# Patient Record
Sex: FEMALE | Race: ASIAN | Employment: OTHER | URBAN - METROPOLITAN AREA
[De-identification: names, ages, dates, MRNs, and addresses within clinical notes are randomized per-mention and may not be internally consistent; named-entity substitution may affect disease eponyms.]

---

## 2020-09-21 ENCOUNTER — OFFICE VISIT (OUTPATIENT)
Dept: PRIMARY CARE CLINIC | Age: 70
End: 2020-09-21
Payer: MEDICARE

## 2020-09-21 VITALS
RESPIRATION RATE: 16 BRPM | BODY MASS INDEX: 26.22 KG/M2 | SYSTOLIC BLOOD PRESSURE: 126 MMHG | WEIGHT: 148 LBS | DIASTOLIC BLOOD PRESSURE: 80 MMHG | HEIGHT: 63 IN | OXYGEN SATURATION: 95 % | TEMPERATURE: 98.1 F | HEART RATE: 60 BPM

## 2020-09-21 VITALS
BODY MASS INDEX: 25.23 KG/M2 | WEIGHT: 142.4 LBS | DIASTOLIC BLOOD PRESSURE: 74 MMHG | TEMPERATURE: 96.8 F | HEIGHT: 63 IN | HEART RATE: 77 BPM | SYSTOLIC BLOOD PRESSURE: 126 MMHG | OXYGEN SATURATION: 97 %

## 2020-09-21 DIAGNOSIS — K21.9 GASTROESOPHAGEAL REFLUX DISEASE WITHOUT ESOPHAGITIS: Chronic | ICD-10-CM

## 2020-09-21 DIAGNOSIS — E11.65 TYPE 2 DIABETES MELLITUS WITH HYPERGLYCEMIA, WITHOUT LONG-TERM CURRENT USE OF INSULIN (HCC): Primary | Chronic | ICD-10-CM

## 2020-09-21 DIAGNOSIS — Z12.11 COLON CANCER SCREENING: ICD-10-CM

## 2020-09-21 DIAGNOSIS — E78.2 MIXED HYPERLIPIDEMIA: Chronic | ICD-10-CM

## 2020-09-21 DIAGNOSIS — J45.40 MODERATE PERSISTENT ASTHMA, UNCOMPLICATED: Chronic | ICD-10-CM

## 2020-09-21 DIAGNOSIS — I10 ESSENTIAL (PRIMARY) HYPERTENSION: Chronic | ICD-10-CM

## 2020-09-21 PROBLEM — E78.5 HYPERLIPIDEMIA: Status: ACTIVE | Noted: 2020-09-21

## 2020-09-21 PROCEDURE — 99214 OFFICE O/P EST MOD 30 MIN: CPT | Performed by: FAMILY MEDICINE

## 2020-09-21 RX ORDER — LINAGLIPTIN 5 MG/1
5 TABLET, FILM COATED ORAL DAILY
Qty: 90 TAB | Refills: 1 | Status: SHIPPED | OUTPATIENT
Start: 2020-09-21 | End: 2021-03-05 | Stop reason: SDUPTHER

## 2020-09-21 RX ORDER — ALBUTEROL SULFATE 90 UG/1
1 AEROSOL, METERED RESPIRATORY (INHALATION) 2 TIMES DAILY
COMMUNITY
Start: 2020-09-15 | End: 2020-09-21 | Stop reason: SDUPTHER

## 2020-09-21 RX ORDER — CALCIUM CITRATE/VITAMIN D3 200MG-6.25
TABLET ORAL
COMMUNITY
Start: 2020-08-18 | End: 2020-09-21 | Stop reason: SDUPTHER

## 2020-09-21 RX ORDER — GLYBURIDE 2.5 MG/1
1 TABLET ORAL 2 TIMES DAILY
COMMUNITY
Start: 2020-07-17 | End: 2020-09-21 | Stop reason: SDUPTHER

## 2020-09-21 RX ORDER — METFORMIN HYDROCHLORIDE 1000 MG/1
1000 TABLET ORAL 2 TIMES DAILY
Qty: 180 TAB | Refills: 1 | Status: SHIPPED | OUTPATIENT
Start: 2020-09-21 | End: 2021-03-05 | Stop reason: SDUPTHER

## 2020-09-21 RX ORDER — METFORMIN HYDROCHLORIDE 1000 MG/1
1 TABLET ORAL 2 TIMES DAILY
COMMUNITY
Start: 2020-07-17 | End: 2020-09-21 | Stop reason: SDUPTHER

## 2020-09-21 RX ORDER — ALBUTEROL SULFATE 90 UG/1
1 AEROSOL, METERED RESPIRATORY (INHALATION) 2 TIMES DAILY
Qty: 1 INHALER | Refills: 2 | Status: SHIPPED | OUTPATIENT
Start: 2020-09-21 | End: 2021-03-05 | Stop reason: SDUPTHER

## 2020-09-21 RX ORDER — CALCIUM CITRATE/VITAMIN D3 200MG-6.25
TABLET ORAL
Qty: 200 STRIP | Refills: 3 | Status: SHIPPED | OUTPATIENT
Start: 2020-09-21 | End: 2021-03-05 | Stop reason: SDUPTHER

## 2020-09-21 RX ORDER — FLUTICASONE PROPIONATE 110 UG/1
1 AEROSOL, METERED RESPIRATORY (INHALATION) 2 TIMES DAILY
Qty: 3 INHALER | Refills: 1 | Status: SHIPPED | OUTPATIENT
Start: 2020-09-21 | End: 2021-03-05 | Stop reason: SDUPTHER

## 2020-09-21 RX ORDER — LISINOPRIL 10 MG/1
10 TABLET ORAL DAILY
Qty: 90 TAB | Refills: 1 | Status: SHIPPED | OUTPATIENT
Start: 2020-09-21 | End: 2021-03-05 | Stop reason: SDUPTHER

## 2020-09-21 RX ORDER — GLYBURIDE 2.5 MG/1
2.5 TABLET ORAL 2 TIMES DAILY
Qty: 180 TAB | Refills: 1 | Status: SHIPPED | OUTPATIENT
Start: 2020-09-21 | End: 2021-03-05 | Stop reason: SDUPTHER

## 2020-09-21 RX ORDER — METOPROLOL TARTRATE 25 MG/1
25 TABLET, FILM COATED ORAL 2 TIMES DAILY
Qty: 180 TAB | Refills: 1 | Status: SHIPPED | OUTPATIENT
Start: 2020-09-21 | End: 2021-03-05 | Stop reason: SDUPTHER

## 2020-09-21 RX ORDER — ATORVASTATIN CALCIUM 10 MG/1
1 TABLET, FILM COATED ORAL DAILY
COMMUNITY
Start: 2020-07-17 | End: 2020-09-21 | Stop reason: SDUPTHER

## 2020-09-21 RX ORDER — LANSOPRAZOLE 30 MG/1
1 CAPSULE, DELAYED RELEASE ORAL DAILY
COMMUNITY
Start: 2020-08-19 | End: 2020-09-21 | Stop reason: SDUPTHER

## 2020-09-21 RX ORDER — ATORVASTATIN CALCIUM 10 MG/1
10 TABLET, FILM COATED ORAL DAILY
Qty: 90 TAB | Refills: 1 | Status: SHIPPED | OUTPATIENT
Start: 2020-09-21 | End: 2021-03-05 | Stop reason: SDUPTHER

## 2020-09-21 RX ORDER — FLUTICASONE PROPIONATE 110 UG/1
1 AEROSOL, METERED RESPIRATORY (INHALATION) 2 TIMES DAILY
COMMUNITY
End: 2020-09-21 | Stop reason: SDUPTHER

## 2020-09-21 RX ORDER — METOPROLOL TARTRATE 25 MG/1
1 TABLET, FILM COATED ORAL 2 TIMES DAILY
COMMUNITY
Start: 2020-08-17 | End: 2020-09-21 | Stop reason: SDUPTHER

## 2020-09-21 RX ORDER — LINAGLIPTIN 5 MG/1
1 TABLET, FILM COATED ORAL DAILY
COMMUNITY
Start: 2020-09-15 | End: 2020-09-21 | Stop reason: SDUPTHER

## 2020-09-21 RX ORDER — LANSOPRAZOLE 30 MG/1
30 CAPSULE, DELAYED RELEASE ORAL DAILY
Qty: 90 CAP | Refills: 1 | Status: SHIPPED | OUTPATIENT
Start: 2020-09-21 | End: 2021-03-05 | Stop reason: SDUPTHER

## 2020-09-21 NOTE — PROGRESS NOTES
HISTORY OF PRESENT ILLNESS  Here with family member as     Diabetes:  DM follow up appt  -200. No diary seen as usual.   Last  Eye exam:2018   PT is taking metformin 1000 BID, glyburide 2.5 BID, tradjenta 5 mg. There is no medication concerns. Denies any neurological symptoms , and cuts heal well. Hypertension:  PT taking lisnorpil 10, lopressor 25. No concern about BP meds, not checking BP>       Hyperlipidemia:     on lipitor 10 mg. No concerns about meds, no myalgias. GERD - doing well on pantoprazole. Never had EGD. colonoscopy now due. Asthma - doing well. Not using MDI  Hardly. No cough. No eunice concerns since last Aileenis Roel is a 72 y.o. female. Past Medical History:   Diagnosis Date    Asthma     Diabetes ([facility] [address]     Heartburn     Hypercholesterolemia     Hyperlipidemia 9/21/2020    Hypertension      Social History     Tobacco Use    Smoking status: Never Smoker    Smokeless tobacco: Never Used   Substance Use Topics    Alcohol use: Not on file    Drug use: Not on file       Family History   Problem Relation Age of Onset    Diabetes Brother        Review of Systems   Constitutional: Negative. Negative for malaise/fatigue and weight loss. HENT: Negative for congestion. Eyes: Negative for pain. Respiratory: Negative for cough and shortness of breath. Cardiovascular: Negative for chest pain, palpitations and leg swelling. Gastrointestinal: Negative for abdominal pain, diarrhea and heartburn. Genitourinary: Negative for dysuria. Musculoskeletal: Negative for myalgias. Neurological: Negative for dizziness, tingling, tremors, weakness and headaches. Psychiatric/Behavioral: Negative for depression. The patient does not have insomnia. Physical Exam  Nursing note reviewed. Exam conducted with a chaperone present. Constitutional:       General: She is not in acute distress. Appearance: Normal appearance.  She is obese. She is not ill-appearing. HENT:      Head: Normocephalic and atraumatic. Eyes:      General: No scleral icterus. Right eye: No discharge. Left eye: No discharge. Extraocular Movements: Extraocular movements intact. Conjunctiva/sclera: Conjunctivae normal.   Cardiovascular:      Rate and Rhythm: Normal rate and regular rhythm. Pulses: Normal pulses. Heart sounds: Normal heart sounds. Pulmonary:      Effort: Pulmonary effort is normal. No respiratory distress. Breath sounds: Normal breath sounds. No wheezing or rales. Abdominal:      General: Abdomen is flat. There is no distension. Palpations: There is no mass. Tenderness: There is no abdominal tenderness. There is no right CVA tenderness or left CVA tenderness. Musculoskeletal:      Right lower leg: No edema. Left lower leg: No edema. Lymphadenopathy:      Cervical: No cervical adenopathy. Skin:     General: Skin is warm. Neurological:      General: No focal deficit present. Mental Status: She is alert and oriented to person, place, and time. Cranial Nerves: No cranial nerve deficit. Psychiatric:         Mood and Affect: Mood normal.         Behavior: Behavior normal.           ASSESSMENT and PLAN  Diagnoses and all orders for this visit:    1. Type 2 diabetes mellitus with hyperglycemia, without long-term current use of insulin ([facility])  Comments:  ? control. maxed out on tradjenta, metformin and glyburide ( she perfers over glipizide). Orders:  -     glyBURIDE (DIABETA) 2.5 mg tablet; Take 1 Tab by mouth two (2) times a day. -     metFORMIN (GLUCOPHAGE) 1,000 mg tablet; Take 1 Tab by mouth two (2) times a day. -     Tradjenta 5 mg tablet; Take 1 Tab by mouth daily.  -     True Metrix Glucose Test Strip strip; USE 1 STRIP TO CHECK GLUCOSE TWICE DAILY  -     METABOLIC PANEL, COMPREHENSIVE  -     LIPID PANEL  -     HEMOGLOBIN A1C WITH EAG    2.  Essential (primary) hypertension  Comments:  at goal  Orders:  -     metoprolol tartrate (LOPRESSOR) 25 mg tablet; Take 1 Tab by mouth two (2) times a day. -     METABOLIC PANEL, COMPREHENSIVE  -     LIPID PANEL  -     CBC WITH AUTOMATED DIFF    3. Gastroesophageal reflux disease without esophagitis  Comments:  controlled but may need egd bc  cant come off meds. Orders:  -     lansoprazole (PREVACID) 30 mg capsule; Take 1 Cap by mouth daily.  -     CBC WITH AUTOMATED DIFF    4. Mixed hyperlipidemia  -     atorvastatin (LIPITOR) 10 mg tablet; Take 1 Tab by mouth daily.  -     METABOLIC PANEL, COMPREHENSIVE  -     LIPID PANEL    5. Moderate persistent asthma, uncomplicated  Comments:  stable  Orders:  -     Ventolin HFA 90 mcg/actuation inhaler; Take 1 Puff by inhalation two (2) times a day. -     fluticasone propionate (FLOVENT HFA) 110 mcg/actuation inhaler; Take 1 Puff by inhalation two (2) times a day. 6. Colon cancer screening  Comments:  time to screen ,  see dr Darling Malagon. I discuss this and   dexa and mmaogram.  she is not ready yet. .. bc of covid. Orders:  -     CBC WITH AUTOMATED DIFF    Other orders  -     lisinopriL (PRINIVIL, ZESTRIL) 10 mg tablet; Take 1 Tab by mouth daily.  Indications: high blood pressure         Orders Placed This Encounter    METABOLIC PANEL, COMPREHENSIVE    LIPID PANEL    CBC WITH AUTOMATED DIFF    HEMOGLOBIN A1C WITH EAG    DISCONTD: Ventolin HFA 90 mcg/actuation inhaler    DISCONTD: True Metrix Glucose Test Strip strip    DISCONTD: glyBURIDE (DIABETA) 2.5 mg tablet    DISCONTD: lansoprazole (PREVACID) 30 mg capsule    DISCONTD: atorvastatin (LIPITOR) 10 mg tablet    DISCONTD: Tradjenta 5 mg tablet    DISCONTD: metFORMIN (GLUCOPHAGE) 1,000 mg tablet    DISCONTD: metoprolol tartrate (LOPRESSOR) 25 mg tablet    atorvastatin (LIPITOR) 10 mg tablet    glyBURIDE (DIABETA) 2.5 mg tablet    lansoprazole (PREVACID) 30 mg capsule    metFORMIN (GLUCOPHAGE) 1,000 mg tablet    metoprolol tartrate (LOPRESSOR) 25 mg tablet    Tradjenta 5 mg tablet    True Metrix Glucose Test Strip strip    DISCONTD: fluticasone propionate (FLOVENT HFA) 110 mcg/actuation inhaler    Ventolin HFA 90 mcg/actuation inhaler    fluticasone propionate (FLOVENT HFA) 110 mcg/actuation inhaler    lisinopriL (PRINIVIL, ZESTRIL) 10 mg tablet     Follow-up and Dispositions    · Return in about 6 months (around 3/21/2021) for Chronic office visit.

## 2020-09-24 RX ORDER — CETIRIZINE HYDROCHLORIDE 10 MG/1
TABLET, FILM COATED ORAL
Qty: 90 TAB | Refills: 99 | Status: SHIPPED | OUTPATIENT
Start: 2020-09-24 | End: 2021-03-05 | Stop reason: SDUPTHER

## 2020-09-29 LAB
ALBUMIN SERPL-MCNC: 4.5 G/DL (ref 3.8–4.8)
ALBUMIN/GLOB SERPL: 1.8 {RATIO} (ref 1.2–2.2)
ALP SERPL-CCNC: 69 IU/L (ref 39–117)
ALT SERPL-CCNC: 18 IU/L (ref 0–32)
AST SERPL-CCNC: 16 IU/L (ref 0–40)
BASOPHILS # BLD AUTO: 0.1 X10E3/UL (ref 0–0.2)
BASOPHILS NFR BLD AUTO: 1 %
BILIRUB SERPL-MCNC: 0.4 MG/DL (ref 0–1.2)
BUN SERPL-MCNC: 29 MG/DL (ref 8–27)
BUN/CREAT SERPL: 29 (ref 12–28)
CALCIUM SERPL-MCNC: 10 MG/DL (ref 8.7–10.3)
CHLORIDE SERPL-SCNC: 99 MMOL/L (ref 96–106)
CHOLEST SERPL-MCNC: 173 MG/DL (ref 100–199)
CO2 SERPL-SCNC: 23 MMOL/L (ref 20–29)
CREAT SERPL-MCNC: 1 MG/DL (ref 0.57–1)
EOSINOPHIL # BLD AUTO: 0.4 X10E3/UL (ref 0–0.4)
EOSINOPHIL NFR BLD AUTO: 5 %
ERYTHROCYTE [DISTWIDTH] IN BLOOD BY AUTOMATED COUNT: 12.6 % (ref 11.7–15.4)
EST. AVERAGE GLUCOSE BLD GHB EST-MCNC: 223 MG/DL
GLOBULIN SER CALC-MCNC: 2.5 G/DL (ref 1.5–4.5)
GLUCOSE SERPL-MCNC: 222 MG/DL (ref 65–99)
HBA1C MFR BLD: 9.4 % (ref 4.8–5.6)
HCT VFR BLD AUTO: 38.6 % (ref 34–46.6)
HDLC SERPL-MCNC: 49 MG/DL
HGB BLD-MCNC: 12.8 G/DL (ref 11.1–15.9)
IMM GRANULOCYTES # BLD AUTO: 0 X10E3/UL (ref 0–0.1)
IMM GRANULOCYTES NFR BLD AUTO: 0 %
LDLC SERPL CALC-MCNC: 81 MG/DL (ref 0–99)
LYMPHOCYTES # BLD AUTO: 2.5 X10E3/UL (ref 0.7–3.1)
LYMPHOCYTES NFR BLD AUTO: 35 %
MCH RBC QN AUTO: 30 PG (ref 26.6–33)
MCHC RBC AUTO-ENTMCNC: 33.2 G/DL (ref 31.5–35.7)
MCV RBC AUTO: 90 FL (ref 79–97)
MONOCYTES # BLD AUTO: 0.6 X10E3/UL (ref 0.1–0.9)
MONOCYTES NFR BLD AUTO: 8 %
NEUTROPHILS # BLD AUTO: 3.6 X10E3/UL (ref 1.4–7)
NEUTROPHILS NFR BLD AUTO: 51 %
PLATELET # BLD AUTO: 290 X10E3/UL (ref 150–450)
POTASSIUM SERPL-SCNC: 5 MMOL/L (ref 3.5–5.2)
PROT SERPL-MCNC: 7 G/DL (ref 6–8.5)
RBC # BLD AUTO: 4.27 X10E6/UL (ref 3.77–5.28)
SODIUM SERPL-SCNC: 137 MMOL/L (ref 134–144)
TRIGL SERPL-MCNC: 265 MG/DL (ref 0–149)
VLDLC SERPL CALC-MCNC: 43 MG/DL (ref 5–40)
WBC # BLD AUTO: 7.1 X10E3/UL (ref 3.4–10.8)

## 2021-03-05 ENCOUNTER — OFFICE VISIT (OUTPATIENT)
Dept: PRIMARY CARE CLINIC | Age: 71
End: 2021-03-05
Payer: MEDICARE

## 2021-03-05 VITALS
SYSTOLIC BLOOD PRESSURE: 172 MMHG | DIASTOLIC BLOOD PRESSURE: 95 MMHG | TEMPERATURE: 97.4 F | BODY MASS INDEX: 24.98 KG/M2 | WEIGHT: 141 LBS | HEART RATE: 78 BPM | HEIGHT: 63 IN

## 2021-03-05 DIAGNOSIS — I10 ESSENTIAL (PRIMARY) HYPERTENSION: Chronic | ICD-10-CM

## 2021-03-05 DIAGNOSIS — I10 ESSENTIAL (PRIMARY) HYPERTENSION: ICD-10-CM

## 2021-03-05 DIAGNOSIS — Z00.00 MEDICARE ANNUAL WELLNESS VISIT, SUBSEQUENT: ICD-10-CM

## 2021-03-05 DIAGNOSIS — E78.2 MIXED HYPERLIPIDEMIA: ICD-10-CM

## 2021-03-05 DIAGNOSIS — J45.40 MODERATE PERSISTENT ASTHMA, UNCOMPLICATED: Chronic | ICD-10-CM

## 2021-03-05 DIAGNOSIS — K21.9 GASTROESOPHAGEAL REFLUX DISEASE WITHOUT ESOPHAGITIS: Chronic | ICD-10-CM

## 2021-03-05 DIAGNOSIS — E11.65 TYPE 2 DIABETES MELLITUS WITH HYPERGLYCEMIA, WITHOUT LONG-TERM CURRENT USE OF INSULIN (HCC): Primary | ICD-10-CM

## 2021-03-05 PROCEDURE — G0439 PPPS, SUBSEQ VISIT: HCPCS | Performed by: FAMILY MEDICINE

## 2021-03-05 PROCEDURE — 99214 OFFICE O/P EST MOD 30 MIN: CPT | Performed by: FAMILY MEDICINE

## 2021-03-05 RX ORDER — METOPROLOL TARTRATE 25 MG/1
25 TABLET, FILM COATED ORAL 2 TIMES DAILY
Qty: 180 TAB | Refills: 1 | Status: SHIPPED | OUTPATIENT
Start: 2021-03-05 | End: 2022-04-19 | Stop reason: SDUPTHER

## 2021-03-05 RX ORDER — ATORVASTATIN CALCIUM 10 MG/1
10 TABLET, FILM COATED ORAL DAILY
Qty: 90 TAB | Refills: 1 | Status: SHIPPED | OUTPATIENT
Start: 2021-03-05 | End: 2021-09-14 | Stop reason: SDUPTHER

## 2021-03-05 RX ORDER — FLUTICASONE PROPIONATE 110 UG/1
1 AEROSOL, METERED RESPIRATORY (INHALATION) 2 TIMES DAILY
Qty: 3 INHALER | Refills: 1 | Status: SHIPPED | OUTPATIENT
Start: 2021-03-05 | End: 2021-08-05 | Stop reason: SDUPTHER

## 2021-03-05 RX ORDER — CETIRIZINE HCL 10 MG
10 TABLET ORAL DAILY
Qty: 90 TAB | Refills: 1 | Status: SHIPPED | OUTPATIENT
Start: 2021-03-05 | End: 2021-08-05 | Stop reason: ALTCHOICE

## 2021-03-05 RX ORDER — CALCIUM CITRATE/VITAMIN D3 200MG-6.25
TABLET ORAL
Qty: 200 STRIP | Refills: 3 | Status: SHIPPED | OUTPATIENT
Start: 2021-03-05 | End: 2021-08-05 | Stop reason: SDUPTHER

## 2021-03-05 RX ORDER — LINAGLIPTIN 5 MG/1
5 TABLET, FILM COATED ORAL DAILY
Qty: 90 TAB | Refills: 1 | Status: SHIPPED | OUTPATIENT
Start: 2021-03-05 | End: 2021-12-07

## 2021-03-05 RX ORDER — METFORMIN HYDROCHLORIDE 1000 MG/1
1000 TABLET ORAL 2 TIMES DAILY
Qty: 180 TAB | Refills: 1 | Status: SHIPPED | OUTPATIENT
Start: 2021-03-05 | End: 2021-03-14

## 2021-03-05 RX ORDER — GLYBURIDE 2.5 MG/1
2.5 TABLET ORAL 2 TIMES DAILY
Qty: 180 TAB | Refills: 1 | Status: SHIPPED | OUTPATIENT
Start: 2021-03-05 | End: 2021-03-14

## 2021-03-05 RX ORDER — ALBUTEROL SULFATE 90 UG/1
1 AEROSOL, METERED RESPIRATORY (INHALATION) 2 TIMES DAILY
Qty: 1 INHALER | Refills: 2 | Status: SHIPPED | OUTPATIENT
Start: 2021-03-05 | End: 2022-07-21 | Stop reason: SDUPTHER

## 2021-03-05 RX ORDER — LISINOPRIL 10 MG/1
10 TABLET ORAL DAILY
Qty: 90 TAB | Refills: 1 | Status: SHIPPED | OUTPATIENT
Start: 2021-03-05 | End: 2021-08-05

## 2021-03-05 RX ORDER — LANSOPRAZOLE 30 MG/1
30 CAPSULE, DELAYED RELEASE ORAL DAILY
Qty: 90 CAP | Refills: 1 | Status: SHIPPED | OUTPATIENT
Start: 2021-03-05 | End: 2022-01-03

## 2021-03-05 NOTE — ACP (ADVANCE CARE PLANNING)
Discussed with patient. Will provide information regarding ACP but she does know she wants to be full code and have her daughter as decision maker if she is unable to make decisions for herself.

## 2021-03-05 NOTE — PROGRESS NOTES
Arsen  [address] [place] [address], [address] [place] [address]  [phone]               Date of visit: 3/5/2021       This is an Subsequent Medicare Annual Wellness Visit (AWV), (Performed more than 12 months after effective date of Medicare Part B enrollment and 12 months after last preventive visit)    I have reviewed the patient's medical history in detail and updated the computerized patient record. Mikayla Bernal is a 72 y.o. female   History obtained from: the patient and daughter. Concerns today   (Patient understands that medical problems addressed today may incur additional cost as this is a preventive visit)    Mikayla Bernal is a 72 y.o. female who presents for medication refill. She is also due for her medicare wellness. She agrees to do both visits understanding there may be a cost for additional services. Patient is compliant on medications without an adverse effects. HTN: at goal  HLD: Lipid panel due  T2DM:  Uncontrolled. Last A1c was 9.4% in September. Checks glucose sporadically, not daily. Does not have a log. Fasting was 180 this morning. Diabetes Checklist  ACE inhibitor: lisinopril 10mg  Last Microalbumin: due  Pneumonia vaccine 19-64?: not sure, will check with [facility]  Last seen by opthalmology: about 2 years ago    History     Patient Active Problem List   Diagnosis Code    Heartburn R12    Asthma J45.909    Diabetes ([facility] [address] E11.9    Essential (primary) hypertension I10    Hyperlipidemia E78.5    Moderate persistent asthma, uncomplicated N66.32    Gastroesophageal reflux disease without esophagitis K21.9     Past Medical History:   Diagnosis Date    Asthma     Diabetes ([facility] [address]     Heartburn     Hypercholesterolemia     Hyperlipidemia 9/21/2020    Hypertension       History reviewed. No pertinent surgical history.   Allergies   Allergen Reactions    Iodine Rash     Current Outpatient Medications   Medication Sig Dispense Refill    cetirizine (Allergy Relief, cetirizine,) 10 mg tablet Take 1 Tab by mouth daily. 90 Tab 1    atorvastatin (LIPITOR) 10 mg tablet Take 1 Tab by mouth daily. 90 Tab 1    glyBURIDE (DIABETA) 2.5 mg tablet Take 1 Tab by mouth two (2) times a day. 180 Tab 1    lansoprazole (PREVACID) 30 mg capsule Take 1 Cap by mouth daily. 90 Cap 1    metFORMIN (GLUCOPHAGE) 1,000 mg tablet Take 1 Tab by mouth two (2) times a day. 180 Tab 1    metoprolol tartrate (LOPRESSOR) 25 mg tablet Take 1 Tab by mouth two (2) times a day. 180 Tab 1    Tradjenta 5 mg tablet Take 1 Tab by mouth daily. 90 Tab 1    True Metrix Glucose Test Strip strip USE 1 STRIP TO CHECK GLUCOSE TWICE DAILY 200 Strip 3    Ventolin HFA 90 mcg/actuation inhaler Take 1 Puff by inhalation two (2) times a day. 1 Inhaler 2    fluticasone propionate (FLOVENT HFA) 110 mcg/actuation inhaler Take 1 Puff by inhalation two (2) times a day. 3 Inhaler 1    lisinopriL (PRINIVIL, ZESTRIL) 10 mg tablet Take 1 Tab by mouth daily. Indications: high blood pressure 90 Tab 1     Family History   Problem Relation Age of Onset    Diabetes Brother      Social History     Tobacco Use    Smoking status: Never Smoker    Smokeless tobacco: Never Used   Substance Use Topics    Alcohol use: Not on file       Specialists/Care Team   Ruthann Akash has established care with the following healthcare providers:  Patient Care Team:  Grace Munoz MD as PCP - General (Family Medicine)  Grace Munoz MD as PCP - [facility]    Health Risk Assessment     Demographics   female  72 y.o.     General Health Questions   -During the past 4 weeks:   -how would you rate your health in general? Good   -how often have you been bothered by feeling dizzy when standing up? never   -how much have you been bothered by bodily pain? not   -Have you noticed any hearing difficulties? no   -has your physical and emotional health limited your social activities with family or friends? no    Emotional Health Questions   -Do you have a history of depression, anxiety, or emotional problems? no  -Over the past 2 weeks, have you felt down, depressed or hopeless? no  -Over the past 2 weeks, have you felt little interest or pleasure in doing things? no    Health Habits   Please describe your diet habits: \"eats well\"  Do you get 5 servings of fruits or vegetables daily? Not sure. Eats korean diet, a lot of rice. Do you exercise regularly? yes    Activities of Daily Living and Functional Status   -Do you need help with eating, walking, dressing, bathing, toileting, the phone, transportation, shopping, preparing meals, housework, laundry, medications or managing money? no  -In the past four weeks, was someone available to help you if you needed and wanted help with anything? yes  -Are you confident are you that you can control and manage most of your health problems? yes  -Have you been given information to help you keep track of your medications? yes  -How often do you have trouble taking your medications as prescribed? never    Fall Risk and Home Safety   Have you fallen 2 or more times in the past year? no  Does your home have rugs in the hallway, lack grab bars in the bathroom, lack handrails on the stairs or have poor lighting? no  Do you have smoke detectors and check them regularly? yes  Do you have difficulties driving a car? no  Do you always fasten your seat belt when you are in a car? yes    Review of Systems (if indicated for problems addressed today)     Review of Systems   Constitutional: Negative for chills, fever and weight loss. HENT: Negative for congestion and sore throat. Eyes: Negative for blurred vision, double vision and redness. Respiratory: Negative for cough and shortness of breath. Cardiovascular: Negative for chest pain and palpitations. Gastrointestinal: Negative for abdominal pain, blood in stool and vomiting. Genitourinary: Negative for dysuria and hematuria.    Musculoskeletal: Negative for falls and myalgias. Skin: Negative for rash. Neurological: Negative for dizziness, focal weakness and headaches. Endo/Heme/Allergies: Negative for polydipsia. Does not bruise/bleed easily. Psychiatric/Behavioral: Negative for substance abuse and suicidal ideas. Reviewed PmHx, FmHx, SocHx as well as meds and allergies, updated and dated in the chart. Physical Examination     Vitals:    03/05/21 0845   BP: (!) 172/95   Pulse: 78   Temp: 97.4 °F (36.3 °C)   TempSrc: Temporal   Weight: 141 lb (64 kg)   Height: 5' 3\" (1.6 m)     Visit Vitals  BP (!) 172/95 (BP 1 Location: Left upper arm, BP Patient Position: Sitting, BP Cuff Size: Large adult)   Pulse 78   Temp 97.4 °F (36.3 °C) (Temporal)   Ht 5' 3\" (1.6 m)   Wt 141 lb (64 kg)   BMI 24.98 kg/m²     Physical Exam  Vitals signs reviewed. Constitutional:       General: She is not in acute distress. Appearance: Normal appearance. HENT:      Head: Normocephalic and atraumatic. Right Ear: External ear normal.      Left Ear: External ear normal.      Nose: Nose normal. No rhinorrhea. Mouth/Throat:      Mouth: Mucous membranes are moist.      Pharynx: Oropharynx is clear. Eyes:      Extraocular Movements: Extraocular movements intact. Conjunctiva/sclera: Conjunctivae normal.      Pupils: Pupils are equal, round, and reactive to light. Neck:      Musculoskeletal: Normal range of motion and neck supple. Cardiovascular:      Rate and Rhythm: Normal rate and regular rhythm. Pulses: Normal pulses. Pulmonary:      Effort: Pulmonary effort is normal. No respiratory distress. Breath sounds: Normal breath sounds. Abdominal:      General: Bowel sounds are normal.      Palpations: Abdomen is soft. Musculoskeletal: Normal range of motion. Right lower leg: No edema. Left lower leg: No edema. Skin:     General: Skin is warm. Findings: No rash. Neurological:      General: No focal deficit present.       Mental Status: She is alert and oriented to person, place, and time. Mental status is at baseline. Psychiatric:         Mood and Affect: Mood normal.         Behavior: Behavior normal.     Diabetic foot exam, including monofilament, sensation and pulses are normal. No fungal toenail infection, wounds, corns or calluses. Left:                                 Filament test (Normal) sensation with micro filament              Pulse DP: 2+                Deformities: No Callus  Right:                               Filament test (Normal) sensation with micro filament              Pulse DP: 2+                Deformities: No Callus    Was the patient's timed Up & Go test unsteady or longer than 30 seconds? no    Evaluation of Cognitive Function   Mood/affect:  happy  Orientation: Person, Place, Time and Situation  Appearance: age appropriate  Family member/caregiver input: daughter present and says mom is pretty on top of her health, no concerns from her point of view.     Advice/Referrals/Counseling (as indicated)   Education and counseling provided for any problems identified above: see above    Preventive Services     Health Maintenance   Topic Date Due    Hepatitis C Screening  Never done    MICROALBUMIN Q1  Never done    Eye Exam Retinal or Dilated  Never done    DTaP/Tdap/Td series (1 - Tdap) Never done    Colorectal Cancer Screening Combo  Never done    Breast Cancer Screen Mammogram  Never done    GLAUCOMA SCREENING Q2Y  Never done    Bone Densitometry (Dexa) Screening  Never done    Pneumococcal 65+ years (1 of 1 - PPSV23) Never done    Shingrix Vaccine Age 50> (2 of 2) 01/02/2020    A1C test (Diabetic or Prediabetic)  12/28/2020    COVID-19 Vaccine (2 of 2 - Moderna series) 03/11/2021    Lipid Screen  09/28/2021    Foot Exam Q1  03/05/2022    Medicare Yearly Exam  03/06/2022    Flu Vaccine  Completed      (Preventive care checklist to be included in patient instructions)  Discussed today Done Previously Not Needed    x   Pneumococcal vaccines    x  Flu vaccine     x Hepatitis B vaccine (if at risk)    x  Shingles vaccine    x  TDAP vaccine   x   Mammogram     x Pap smear   x   Colorectal cancer screening     x Low-dose CT for lung cancer screening   x   Bone density test   x   Glaucoma screening   x   Cholesterol test     Discussion of Advance Directive   Discussed with Gabe Crell her ability to prepare and advance directive in the case that an injury or illness causes her to be unable to make health care decisions. Provided information regarding a detailed ACP. Assessment/Plan   Diagnoses and all orders for this visit:    1. Type 2 diabetes mellitus with hyperglycemia, without long-term current use of insulin ([facility] [address]  Comments:  Concerned it is uncontrolled. Encouraged her to keep log. Checking A1c. May need Endocrinology referral if not improved  Orders:  -     HEMOGLOBIN A1C WITH EAG  -      DIABETES FOOT EXAM  -     MICROALBUMIN, UR, RAND W/ MICROALB/CREAT RATIO  -     REFERRAL TO OPHTHALMOLOGY  -     glyBURIDE (DIABETA) 2.5 mg tablet; Take 1 Tab by mouth two (2) times a day. -     metFORMIN (GLUCOPHAGE) 1,000 mg tablet; Take 1 Tab by mouth two (2) times a day. -     Tradjenta 5 mg tablet; Take 1 Tab by mouth daily.  -     True Metrix Glucose Test Strip strip; USE 1 STRIP TO CHECK GLUCOSE TWICE DAILY    2. Essential (primary) hypertension  Comments:  Did not take medication before visit. Orders:  -     METABOLIC PANEL, BASIC  -     metoprolol tartrate (LOPRESSOR) 25 mg tablet; Take 1 Tab by mouth two (2) times a day. 3. Mixed hyperlipidemia  -     LIPID PANEL  -     atorvastatin (LIPITOR) 10 mg tablet; Take 1 Tab by mouth daily. 4. Medicare annual wellness visit, subsequent    5. Gastroesophageal reflux disease without esophagitis  Comments:  controlled but may need egd bc  cant come off meds. Orders:  -     lansoprazole (PREVACID) 30 mg capsule; Take 1 Cap by mouth daily.     6. Essential (primary) hypertension  Comments:  at goal  Orders:  -     METABOLIC PANEL, BASIC  -     metoprolol tartrate (LOPRESSOR) 25 mg tablet; Take 1 Tab by mouth two (2) times a day. 7. Moderate persistent asthma, uncomplicated  Comments:  stable  Orders:  -     Ventolin HFA 90 mcg/actuation inhaler; Take 1 Puff by inhalation two (2) times a day. -     fluticasone propionate (FLOVENT HFA) 110 mcg/actuation inhaler; Take 1 Puff by inhalation two (2) times a day. Other orders  -     cetirizine (Allergy Relief, cetirizine,) 10 mg tablet; Take 1 Tab by mouth daily. -     lisinopriL (PRINIVIL, ZESTRIL) 10 mg tablet; Take 1 Tab by mouth daily. Indications: high blood pressure      - Discussed colonoscopy and DeXA. Does not want to do now due to COVID  - Referred to ophtho  - I am concerned her diabetes may still not be controlled, will see what A1c shows. Encouraged to check glucose daily and bring in log to visits. Follow-up and Dispositions    · Return in about 6 months (around 9/5/2021) for chronic follow up. I have discussed the diagnosis with the patient and the intended plan as seen in the above orders. The patient has received an after-visit summary and questions were answered concerning future plans. I have discussed medication side effects and warnings with the patient as well.       Angeline Barraza MD  [facility]

## 2021-03-05 NOTE — PATIENT INSTRUCTIONS
The best way to stay healthy is to live a healthy lifestyle. A healthy lifestyle includes regular exercise, eating a well-balanced diet, keeping a healthy weight and not smoking. Regular physical exams and screening tests are another important way to take care of yourself. Preventive exams provided by health care providers can find health problems early when treatment works best and can keep you from getting certain diseases or illnesses. Preventive services include exams, lab tests, screenings, shots, monitoring and information to help you take care of your own health. All people over 65 should have a pneumonia shot. Pneumonia shots are usually only needed once in a lifetime unless your doctor decides differently. In addition to your physical exam, some screening tests are recommended:    All people over 65 should have a yearly flu shot. People over 65 are at medium to high risk for Hepatitis B. Three shots are needed for complete protection. Bone mass measurement (dexa scan) is recommended every two years. Diabetes Mellitus screening is recommended every year. Glaucoma is an eye disease caused by high pressure in the eye. An eye exam is recommended every year. Cardiovascular screening tests that check your cholesterol and other blood fat (lipid) levels are recommended every five years. Colorectal Cancer screening tests help to find pre-cancerous polyps (growths in the colon) so they can be removed before they turn into cancer. Tests ordered for screening depend on your personal and family history risk factors. Prostate Cancer Screening (annually up to age 76)    Screening for breast cancer is recommended yearly with a Mammogram.    Screening for cervical and vaginal cancer is recommended with a pelvic and Pap test every two years.  However if you have had an abnormal pap in the past  three years or at high risk for cervical or vaginal cancer Medicare will cover a pap test and a pelvic exam every year.      Here is a list of your current Health Maintenance items with a due date:  Health Maintenance Due   Topic Date Due    Hepatitis C Test  Never done    Albumin Urine Test  Never done    Eye Exam  Never done    DTaP/Tdap/Td  (1 - Tdap) Never done    Colorectal Screening  Never done    Mammogram  Never done    Glaucoma Screening   Never done    Bone Mineral Density   Never done    Pneumococcal Vaccine (1 of 1 - PPSV23) Never done    Shingles Vaccine (2 of 2) 01/02/2020    Hemoglobin A1C    12/28/2020

## 2021-03-07 LAB
ALBUMIN/CREAT UR: 15 MG/G CREAT (ref 0–29)
BUN SERPL-MCNC: 17 MG/DL (ref 8–27)
BUN/CREAT SERPL: 18 (ref 12–28)
CALCIUM SERPL-MCNC: 10 MG/DL (ref 8.7–10.3)
CHLORIDE SERPL-SCNC: 97 MMOL/L (ref 96–106)
CHOLEST SERPL-MCNC: 181 MG/DL (ref 100–199)
CO2 SERPL-SCNC: 26 MMOL/L (ref 20–29)
CREAT SERPL-MCNC: 0.92 MG/DL (ref 0.57–1)
CREAT UR-MCNC: 88.4 MG/DL
EST. AVERAGE GLUCOSE BLD GHB EST-MCNC: 214 MG/DL
GLUCOSE SERPL-MCNC: 215 MG/DL (ref 65–99)
HBA1C MFR BLD: 9.1 % (ref 4.8–5.6)
HDLC SERPL-MCNC: 63 MG/DL
LDLC SERPL CALC-MCNC: 87 MG/DL (ref 0–99)
MICROALBUMIN UR-MCNC: 13 UG/ML
POTASSIUM SERPL-SCNC: 4.3 MMOL/L (ref 3.5–5.2)
SODIUM SERPL-SCNC: 136 MMOL/L (ref 134–144)
TRIGL SERPL-MCNC: 183 MG/DL (ref 0–149)
VLDLC SERPL CALC-MCNC: 31 MG/DL (ref 5–40)

## 2021-03-10 NOTE — PROGRESS NOTES
Pls call patient. Labs still show diabetes not controlled and cholesterol levels elevated. She needs to follow up with endocrinology. Work on diet in the meantime and check glucose as discussed at visit. It will be beneficial for her to take the log with her to the visit.    [facility]  MD Fransisco Esquivel MD  [address], [address] [place] [address]

## 2021-03-16 NOTE — PROGRESS NOTES
Called and talked with Patient regarding bw results. Info for Endocrinology given to Patient to call and set up appointment.  --Laila Zimmerman

## 2021-08-05 ENCOUNTER — OFFICE VISIT (OUTPATIENT)
Dept: PRIMARY CARE CLINIC | Age: 71
End: 2021-08-05
Payer: MEDICARE

## 2021-08-05 VITALS
WEIGHT: 141 LBS | HEIGHT: 63 IN | OXYGEN SATURATION: 98 % | HEART RATE: 73 BPM | BODY MASS INDEX: 24.98 KG/M2 | TEMPERATURE: 97.1 F | RESPIRATION RATE: 18 BRPM

## 2021-08-05 DIAGNOSIS — Z11.59 NEED FOR HEPATITIS C SCREENING TEST: ICD-10-CM

## 2021-08-05 DIAGNOSIS — E11.65 TYPE 2 DIABETES MELLITUS WITH HYPERGLYCEMIA, WITHOUT LONG-TERM CURRENT USE OF INSULIN (HCC): Primary | ICD-10-CM

## 2021-08-05 DIAGNOSIS — Z13.31 DEPRESSION SCREEN: ICD-10-CM

## 2021-08-05 DIAGNOSIS — E78.2 MIXED HYPERLIPIDEMIA: ICD-10-CM

## 2021-08-05 DIAGNOSIS — I10 ESSENTIAL (PRIMARY) HYPERTENSION: ICD-10-CM

## 2021-08-05 DIAGNOSIS — Z12.31 ENCOUNTER FOR SCREENING MAMMOGRAM FOR MALIGNANT NEOPLASM OF BREAST: ICD-10-CM

## 2021-08-05 DIAGNOSIS — Z78.0 POSTMENOPAUSAL: ICD-10-CM

## 2021-08-05 DIAGNOSIS — J45.40 MODERATE PERSISTENT ASTHMA, UNCOMPLICATED: Chronic | ICD-10-CM

## 2021-08-05 PROCEDURE — G8510 SCR DEP NEG, NO PLAN REQD: HCPCS | Performed by: FAMILY MEDICINE

## 2021-08-05 PROCEDURE — 1101F PT FALLS ASSESS-DOCD LE1/YR: CPT | Performed by: FAMILY MEDICINE

## 2021-08-05 PROCEDURE — G8400 PT W/DXA NO RESULTS DOC: HCPCS | Performed by: FAMILY MEDICINE

## 2021-08-05 PROCEDURE — G8427 DOCREV CUR MEDS BY ELIG CLIN: HCPCS | Performed by: FAMILY MEDICINE

## 2021-08-05 PROCEDURE — G8756 NO BP MEASURE DOC: HCPCS | Performed by: FAMILY MEDICINE

## 2021-08-05 PROCEDURE — G8420 CALC BMI NORM PARAMETERS: HCPCS | Performed by: FAMILY MEDICINE

## 2021-08-05 PROCEDURE — 2022F DILAT RTA XM EVC RTNOPTHY: CPT | Performed by: FAMILY MEDICINE

## 2021-08-05 PROCEDURE — 3017F COLORECTAL CA SCREEN DOC REV: CPT | Performed by: FAMILY MEDICINE

## 2021-08-05 PROCEDURE — G9899 SCRN MAM PERF RSLTS DOC: HCPCS | Performed by: FAMILY MEDICINE

## 2021-08-05 PROCEDURE — 99215 OFFICE O/P EST HI 40 MIN: CPT | Performed by: FAMILY MEDICINE

## 2021-08-05 PROCEDURE — 3046F HEMOGLOBIN A1C LEVEL >9.0%: CPT | Performed by: FAMILY MEDICINE

## 2021-08-05 PROCEDURE — G8536 NO DOC ELDER MAL SCRN: HCPCS | Performed by: FAMILY MEDICINE

## 2021-08-05 PROCEDURE — 1090F PRES/ABSN URINE INCON ASSESS: CPT | Performed by: FAMILY MEDICINE

## 2021-08-05 RX ORDER — MINERAL OIL
180 ENEMA (ML) RECTAL DAILY
Qty: 90 TABLET | Refills: 1 | Status: SHIPPED | OUTPATIENT
Start: 2021-08-05 | End: 2022-04-19 | Stop reason: SDUPTHER

## 2021-08-05 RX ORDER — FLUTICASONE PROPIONATE 110 UG/1
1 AEROSOL, METERED RESPIRATORY (INHALATION) 2 TIMES DAILY
Qty: 3 INHALER | Refills: 1 | Status: SHIPPED | OUTPATIENT
Start: 2021-08-05

## 2021-08-05 RX ORDER — CALCIUM CITRATE/VITAMIN D3 200MG-6.25
TABLET ORAL
Qty: 100 STRIP | Refills: 1 | Status: SHIPPED | OUTPATIENT
Start: 2021-08-05 | End: 2022-01-06 | Stop reason: SDUPTHER

## 2021-08-05 RX ORDER — MONTELUKAST SODIUM 10 MG/1
10 TABLET ORAL DAILY
Qty: 90 TABLET | Refills: 1 | Status: SHIPPED | OUTPATIENT
Start: 2021-08-05 | End: 2021-12-07

## 2021-08-05 RX ORDER — LISINOPRIL AND HYDROCHLOROTHIAZIDE 20; 25 MG/1; MG/1
1 TABLET ORAL DAILY
Qty: 90 TABLET | Refills: 1 | Status: SHIPPED | OUTPATIENT
Start: 2021-08-05 | End: 2021-12-07

## 2021-08-05 RX ORDER — GUAIFENESIN 1200 MG/1
1200 TABLET, EXTENDED RELEASE ORAL 2 TIMES DAILY
COMMUNITY

## 2021-08-05 NOTE — PROGRESS NOTES
HPI     Chief Complaint:   Chief Complaint   Patient presents with    Follow-up     DM        HPI:  Kinga Young is a 72 y.o. female who presents for medication refill. HTN: elevated today and in the past. Is taking medications. HLD: On statin. No myalgias. Lab Results   Component Value Date/Time    Cholesterol, total 181 03/05/2021 09:19 AM    HDL Cholesterol 63 03/05/2021 09:19 AM    LDL, calculated 87 03/05/2021 09:19 AM    VLDL, calculated 31 03/05/2021 09:19 AM    Triglyceride 183 (H) 03/05/2021 09:19 AM      T2DM: checks fasting, recently fasting is in the 200s. No changes to diet. Takes meds daily. Lab Results   Component Value Date/Time    Hemoglobin A1c 9.1 (H) 03/05/2021 09:19 AM      Diabetes Checklist  ACE inhibitor: taking  Last Lipid panel: UTD  Statin: yes  Last Microalbumin: reviewed  Pneumonia vaccine 19-64?: reviewed  Last seen by opthalmology: reviewed    Moderate Persistent Asthma: chronic. She was on singulair, stopped taking it, not sure why. No wheezing. Does sometimes get cough, triggered by mucus in throat. Has been using mucinex DM (not Mucinex D, confirmed) recently. Does not feel cetirizine works. Review of Systems   Constitutional: Negative for chills and fever. Respiratory: Negative for cough and shortness of breath. Cardiovascular: Negative for chest pain and palpitations. Psychiatric/Behavioral: Negative for hallucinations and substance abuse. Reviewed PmHx, FmHx, SocHx as well as meds and allergies, updated and dated in the chart.     Past Medical History:   Diagnosis Date    Asthma     Diabetes ([facility]     Heartburn     Hypercholesterolemia     Hyperlipidemia 9/21/2020    Hypertension      Social History     Tobacco Use    Smoking status: Never Smoker    Smokeless tobacco: Never Used   Substance Use Topics    Alcohol use: Not on file    Drug use: Not on file       Current Outpatient Medications on File Prior to Visit   Medication Sig Dispense Refill  guaiFENesin (Mucinex) 1,200 mg Ta12 ER tablet Take 1,200 mg by mouth two (2) times a day.  metFORMIN (GLUCOPHAGE) 1,000 mg tablet Take 1 tablet by mouth twice daily 180 Tab 1    glyBURIDE (DIABETA) 2.5 mg tablet Take 1 tablet by mouth twice daily 180 Tab 1    atorvastatin (LIPITOR) 10 mg tablet Take 1 Tab by mouth daily. 90 Tab 1    lansoprazole (PREVACID) 30 mg capsule Take 1 Cap by mouth daily. 90 Cap 1    metoprolol tartrate (LOPRESSOR) 25 mg tablet Take 1 Tab by mouth two (2) times a day. 180 Tab 1    Tradjenta 5 mg tablet Take 1 Tab by mouth daily. 90 Tab 1    Ventolin HFA 90 mcg/actuation inhaler Take 1 Puff by inhalation two (2) times a day. 1 Inhaler 2    [DISCONTINUED] lisinopril-hydroCHLOROthiazide (PRINZIDE, ZESTORETIC) 20-12.5 mg per tablet Take 1 tablet by mouth once daily for 90 days 90 Tab 1    [DISCONTINUED] cetirizine (Allergy Relief, cetirizine,) 10 mg tablet Take 1 Tab by mouth daily. 90 Tab 1    [DISCONTINUED] True Metrix Glucose Test Strip strip USE 1 STRIP TO CHECK GLUCOSE TWICE DAILY 200 Strip 3    [DISCONTINUED] fluticasone propionate (FLOVENT HFA) 110 mcg/actuation inhaler Take 1 Puff by inhalation two (2) times a day. 3 Inhaler 1    [DISCONTINUED] lisinopriL (PRINIVIL, ZESTRIL) 10 mg tablet Take 1 Tab by mouth daily. Indications: high blood pressure 90 Tab 1     No current facility-administered medications on file prior to visit. Allergies   Allergen Reactions    Iodine Rash          Objective     Visit Vitals  Pulse 73   Temp 97.1 °F (36.2 °C) (Temporal)   Resp 18   Ht 5' 3\" (1.6 m)   Wt 141 lb (64 kg)   SpO2 98%   BMI 24.98 kg/m²     Physical Exam  Vitals and nursing note reviewed. Constitutional:       Appearance: Normal appearance. HENT:      Head: Normocephalic and atraumatic.       Right Ear: External ear normal.      Left Ear: External ear normal.      Mouth/Throat:      Mouth: Mucous membranes are moist.      Pharynx: No posterior oropharyngeal erythema. Comments: Mucus noted in posterior oropharynx. Eyes:      Extraocular Movements: Extraocular movements intact. Conjunctiva/sclera: Conjunctivae normal.   Cardiovascular:      Rate and Rhythm: Normal rate and regular rhythm. Heart sounds: Normal heart sounds. Pulmonary:      Effort: Pulmonary effort is normal. No respiratory distress. Breath sounds: Normal breath sounds. Neurological:      General: No focal deficit present. Mental Status: She is alert and oriented to person, place, and time. Assessment and Plan     Diagnoses and all orders for this visit:    1. Type 2 diabetes mellitus with hyperglycemia, without long-term current use of insulin ([address]): previously concerned it was not controlled. Likely still not controlled. -     HEMOGLOBIN A1C WITH EAG  -     MICROALBUMIN, UR, RAND W/ MICROALB/CREAT RATIO  -     REFERRAL TO OPHTHALMOLOGY  -     True Metrix Glucose Test Strip strip; USE 1 STRIP TO CHECK GLUCOSE DAILY. 2. Essential (primary) hypertension: increasing prinzide to 20/25mg from 20/12.5mg  -     CBC W/O DIFF    3. Mixed hyperlipidemia: continue statin. -     LIPID PANEL  -     METABOLIC PANEL, COMPREHENSIVE    4. Moderate persistent asthma, uncomplicated: restart singulair. Use flonase and allegra prn post nasal drip. -     fluticasone propionate (FLOVENT HFA) 110 mcg/actuation inhaler; Take 1 Puff by inhalation two (2) times a day. 5. Postmenopausal  -     DEXA BONE DENSITY STUDY AXIAL; Future    6. Encounter for screening mammogram for malignant neoplasm of breast  -     [facility] MAMMO BI SCREENING INCL CAD; Future    7. Need for hepatitis C screening test  -     HEPATITIS C AB    8. Depression screen  -     [place] DEPRESSION SCREEN ANNUAL    Other orders  -     lisinopril-hydroCHLOROthiazide (PRINZIDE, ZESTORETIC) 20-25 mg per tablet; Take 1 Tablet by mouth daily. -     fexofenadine (ALLEGRA) 180 mg tablet; Take 1 Tablet by mouth daily.   - montelukast (SINGULAIR) 10 mg tablet; Take 1 Tablet by mouth daily. Follow-up and Dispositions    · Return in about 1 month (around 9/5/2021) for BP F/U. I have discussed the diagnosis with the patient and the intended plan as seen in the above orders. The patient has received an after-visit summary and questions were answered concerning future plans. I have discussed medication side effects and warnings with the patient as well.       Sarah Hernandez MD  [address] [place] [address]

## 2021-08-06 LAB
ALBUMIN SERPL-MCNC: 4.3 G/DL (ref 3.8–4.8)
ALBUMIN/CREAT UR: <21 MG/G CREAT (ref 0–29)
ALBUMIN/GLOB SERPL: 1.5 {RATIO} (ref 1.2–2.2)
ALP SERPL-CCNC: 62 IU/L (ref 48–121)
ALT SERPL-CCNC: 13 IU/L (ref 0–32)
AST SERPL-CCNC: 12 IU/L (ref 0–40)
BILIRUB SERPL-MCNC: 0.3 MG/DL (ref 0–1.2)
BUN SERPL-MCNC: 16 MG/DL (ref 8–27)
BUN/CREAT SERPL: 20 (ref 12–28)
CALCIUM SERPL-MCNC: 9.9 MG/DL (ref 8.7–10.3)
CHLORIDE SERPL-SCNC: 98 MMOL/L (ref 96–106)
CHOLEST SERPL-MCNC: 178 MG/DL (ref 100–199)
CO2 SERPL-SCNC: 22 MMOL/L (ref 20–29)
CREAT SERPL-MCNC: 0.79 MG/DL (ref 0.57–1)
CREAT UR-MCNC: 14.6 MG/DL
ERYTHROCYTE [DISTWIDTH] IN BLOOD BY AUTOMATED COUNT: 12.7 % (ref 11.7–15.4)
EST. AVERAGE GLUCOSE BLD GHB EST-MCNC: 194 MG/DL
GLOBULIN SER CALC-MCNC: 2.9 G/DL (ref 1.5–4.5)
GLUCOSE SERPL-MCNC: 116 MG/DL (ref 65–99)
HBA1C MFR BLD: 8.4 % (ref 4.8–5.6)
HCT VFR BLD AUTO: 38.4 % (ref 34–46.6)
HCV AB S/CO SERPL IA: <0.1 S/CO RATIO (ref 0–0.9)
HDLC SERPL-MCNC: 60 MG/DL
HGB BLD-MCNC: 12 G/DL (ref 11.1–15.9)
LDLC SERPL CALC-MCNC: 81 MG/DL (ref 0–99)
MCH RBC QN AUTO: 28 PG (ref 26.6–33)
MCHC RBC AUTO-ENTMCNC: 31.3 G/DL (ref 31.5–35.7)
MCV RBC AUTO: 90 FL (ref 79–97)
MICROALBUMIN UR-MCNC: <3 UG/ML
PLATELET # BLD AUTO: 294 X10E3/UL (ref 150–450)
POTASSIUM SERPL-SCNC: 4.4 MMOL/L (ref 3.5–5.2)
PROT SERPL-MCNC: 7.2 G/DL (ref 6–8.5)
RBC # BLD AUTO: 4.29 X10E6/UL (ref 3.77–5.28)
SODIUM SERPL-SCNC: 138 MMOL/L (ref 134–144)
TRIGL SERPL-MCNC: 223 MG/DL (ref 0–149)
VLDLC SERPL CALC-MCNC: 37 MG/DL (ref 5–40)
WBC # BLD AUTO: 9.1 X10E3/UL (ref 3.4–10.8)

## 2021-08-12 NOTE — PROGRESS NOTES
Spoke with patient's daughter and patient  Discussed at length diabetes still not at goal.  At visit, discussed how BP not at goal.  She says they do not want to add meds for either. Definitely do not want injections. Already on 3 DM oral medications. Discussed risk of uncontrolled DM and HTN. They want to continue with meds as they are. Discussed risks vs benefits. No change in their stance.     Alexander Gavin MD

## 2021-09-14 DIAGNOSIS — E78.2 MIXED HYPERLIPIDEMIA: ICD-10-CM

## 2021-09-15 RX ORDER — ATORVASTATIN CALCIUM 10 MG/1
10 TABLET, FILM COATED ORAL DAILY
Qty: 90 TABLET | Refills: 1 | Status: SHIPPED | OUTPATIENT
Start: 2021-09-15 | End: 2021-12-07

## 2021-09-29 DIAGNOSIS — E11.65 TYPE 2 DIABETES MELLITUS WITH HYPERGLYCEMIA, WITHOUT LONG-TERM CURRENT USE OF INSULIN (HCC): ICD-10-CM

## 2021-09-29 RX ORDER — GLYBURIDE 2.5 MG/1
TABLET ORAL
Qty: 180 TABLET | Refills: 1 | Status: SHIPPED | OUTPATIENT
Start: 2021-09-29 | End: 2021-12-07

## 2021-12-07 DIAGNOSIS — E78.2 MIXED HYPERLIPIDEMIA: ICD-10-CM

## 2021-12-07 DIAGNOSIS — E11.65 TYPE 2 DIABETES MELLITUS WITH HYPERGLYCEMIA, WITHOUT LONG-TERM CURRENT USE OF INSULIN (HCC): ICD-10-CM

## 2021-12-07 RX ORDER — METFORMIN HYDROCHLORIDE 1000 MG/1
TABLET ORAL
Qty: 180 TABLET | Refills: 0 | Status: SHIPPED | OUTPATIENT
Start: 2021-12-07 | End: 2022-03-11

## 2021-12-07 RX ORDER — LISINOPRIL AND HYDROCHLOROTHIAZIDE 20; 25 MG/1; MG/1
TABLET ORAL
Qty: 90 TABLET | Refills: 0 | Status: SHIPPED | OUTPATIENT
Start: 2021-12-07 | End: 2022-04-19 | Stop reason: SDUPTHER

## 2021-12-07 RX ORDER — MONTELUKAST SODIUM 10 MG/1
TABLET ORAL
Qty: 90 TABLET | Refills: 0 | Status: SHIPPED | OUTPATIENT
Start: 2021-12-07 | End: 2022-04-19 | Stop reason: SDUPTHER

## 2021-12-07 RX ORDER — GLYBURIDE 2.5 MG/1
TABLET ORAL
Qty: 180 TABLET | Refills: 0 | Status: SHIPPED | OUTPATIENT
Start: 2021-12-07 | End: 2022-03-29

## 2021-12-07 RX ORDER — ATORVASTATIN CALCIUM 10 MG/1
TABLET, FILM COATED ORAL
Qty: 90 TABLET | Refills: 0 | Status: SHIPPED | OUTPATIENT
Start: 2021-12-07 | End: 2022-04-12

## 2021-12-07 RX ORDER — LINAGLIPTIN 5 MG/1
TABLET, FILM COATED ORAL
Qty: 90 TABLET | Refills: 0 | Status: SHIPPED | OUTPATIENT
Start: 2021-12-07 | End: 2022-01-03

## 2021-12-30 DIAGNOSIS — E11.65 TYPE 2 DIABETES MELLITUS WITH HYPERGLYCEMIA, WITHOUT LONG-TERM CURRENT USE OF INSULIN (HCC): ICD-10-CM

## 2021-12-30 DIAGNOSIS — K21.9 GASTROESOPHAGEAL REFLUX DISEASE WITHOUT ESOPHAGITIS: Chronic | ICD-10-CM

## 2022-01-03 RX ORDER — LINAGLIPTIN 5 MG/1
TABLET, FILM COATED ORAL
Qty: 90 TABLET | Refills: 0 | Status: SHIPPED | OUTPATIENT
Start: 2022-01-03 | End: 2022-04-19 | Stop reason: SDUPTHER

## 2022-01-03 RX ORDER — LANSOPRAZOLE 30 MG/1
CAPSULE, DELAYED RELEASE ORAL
Qty: 90 CAPSULE | Refills: 0 | Status: SHIPPED | OUTPATIENT
Start: 2022-01-03 | End: 2022-04-12

## 2022-01-06 DIAGNOSIS — E11.65 TYPE 2 DIABETES MELLITUS WITH HYPERGLYCEMIA, WITHOUT LONG-TERM CURRENT USE OF INSULIN (HCC): ICD-10-CM

## 2022-01-06 RX ORDER — CALCIUM CITRATE/VITAMIN D3 200MG-6.25
TABLET ORAL
Qty: 100 STRIP | Refills: 1 | Status: SHIPPED | OUTPATIENT
Start: 2022-01-06 | End: 2022-04-19 | Stop reason: SDUPTHER

## 2022-03-18 PROBLEM — J45.40 MODERATE PERSISTENT ASTHMA, UNCOMPLICATED: Status: ACTIVE | Noted: 2020-09-21

## 2022-03-19 PROBLEM — E78.5 HYPERLIPIDEMIA: Status: ACTIVE | Noted: 2020-09-21

## 2022-03-19 PROBLEM — K21.9 GASTROESOPHAGEAL REFLUX DISEASE WITHOUT ESOPHAGITIS: Status: ACTIVE | Noted: 2020-09-21

## 2022-04-11 DIAGNOSIS — E11.65 TYPE 2 DIABETES MELLITUS WITH HYPERGLYCEMIA, WITHOUT LONG-TERM CURRENT USE OF INSULIN (HCC): ICD-10-CM

## 2022-04-12 RX ORDER — METFORMIN HYDROCHLORIDE 1000 MG/1
1000 TABLET ORAL 2 TIMES DAILY
Qty: 60 TABLET | Refills: 0 | Status: SHIPPED | OUTPATIENT
Start: 2022-04-12 | End: 2022-04-19 | Stop reason: SDUPTHER

## 2022-04-19 ENCOUNTER — OFFICE VISIT (OUTPATIENT)
Dept: PRIMARY CARE CLINIC | Age: 72
End: 2022-04-19
Payer: MEDICARE

## 2022-04-19 VITALS
OXYGEN SATURATION: 94 % | HEIGHT: 63 IN | RESPIRATION RATE: 16 BRPM | SYSTOLIC BLOOD PRESSURE: 168 MMHG | HEART RATE: 72 BPM | WEIGHT: 143 LBS | TEMPERATURE: 98.5 F | BODY MASS INDEX: 25.34 KG/M2 | DIASTOLIC BLOOD PRESSURE: 86 MMHG

## 2022-04-19 DIAGNOSIS — I10 ESSENTIAL (PRIMARY) HYPERTENSION: ICD-10-CM

## 2022-04-19 DIAGNOSIS — E78.2 MIXED HYPERLIPIDEMIA: ICD-10-CM

## 2022-04-19 DIAGNOSIS — E11.65 TYPE 2 DIABETES MELLITUS WITH HYPERGLYCEMIA, WITHOUT LONG-TERM CURRENT USE OF INSULIN (HCC): ICD-10-CM

## 2022-04-19 DIAGNOSIS — J45.40 MODERATE PERSISTENT ASTHMA, UNCOMPLICATED: Primary | ICD-10-CM

## 2022-04-19 PROCEDURE — 1090F PRES/ABSN URINE INCON ASSESS: CPT | Performed by: NURSE PRACTITIONER

## 2022-04-19 PROCEDURE — G8432 DEP SCR NOT DOC, RNG: HCPCS | Performed by: NURSE PRACTITIONER

## 2022-04-19 PROCEDURE — G9899 SCRN MAM PERF RSLTS DOC: HCPCS | Performed by: NURSE PRACTITIONER

## 2022-04-19 PROCEDURE — G8427 DOCREV CUR MEDS BY ELIG CLIN: HCPCS | Performed by: NURSE PRACTITIONER

## 2022-04-19 PROCEDURE — 2022F DILAT RTA XM EVC RTNOPTHY: CPT | Performed by: NURSE PRACTITIONER

## 2022-04-19 PROCEDURE — 1101F PT FALLS ASSESS-DOCD LE1/YR: CPT | Performed by: NURSE PRACTITIONER

## 2022-04-19 PROCEDURE — 99215 OFFICE O/P EST HI 40 MIN: CPT | Performed by: NURSE PRACTITIONER

## 2022-04-19 PROCEDURE — G8400 PT W/DXA NO RESULTS DOC: HCPCS | Performed by: NURSE PRACTITIONER

## 2022-04-19 PROCEDURE — G8536 NO DOC ELDER MAL SCRN: HCPCS | Performed by: NURSE PRACTITIONER

## 2022-04-19 PROCEDURE — G8754 DIAS BP LESS 90: HCPCS | Performed by: NURSE PRACTITIONER

## 2022-04-19 PROCEDURE — G8753 SYS BP > OR = 140: HCPCS | Performed by: NURSE PRACTITIONER

## 2022-04-19 PROCEDURE — 3017F COLORECTAL CA SCREEN DOC REV: CPT | Performed by: NURSE PRACTITIONER

## 2022-04-19 PROCEDURE — G8419 CALC BMI OUT NRM PARAM NOF/U: HCPCS | Performed by: NURSE PRACTITIONER

## 2022-04-19 PROCEDURE — 3046F HEMOGLOBIN A1C LEVEL >9.0%: CPT | Performed by: NURSE PRACTITIONER

## 2022-04-19 RX ORDER — CALCIUM CITRATE/VITAMIN D3 200MG-6.25
TABLET ORAL
Qty: 100 STRIP | Refills: 1 | Status: SHIPPED | OUTPATIENT
Start: 2022-04-19 | End: 2022-08-10 | Stop reason: SDUPTHER

## 2022-04-19 RX ORDER — MONTELUKAST SODIUM 10 MG/1
10 TABLET ORAL DAILY
Qty: 90 TABLET | Refills: 1 | Status: SHIPPED | OUTPATIENT
Start: 2022-04-19 | End: 2022-09-21

## 2022-04-19 RX ORDER — MINERAL OIL
180 ENEMA (ML) RECTAL DAILY
Qty: 90 TABLET | Refills: 1 | Status: SHIPPED | OUTPATIENT
Start: 2022-04-19

## 2022-04-19 RX ORDER — METFORMIN HYDROCHLORIDE 1000 MG/1
1000 TABLET ORAL 2 TIMES DAILY
Qty: 180 TABLET | Refills: 1 | Status: SHIPPED | OUTPATIENT
Start: 2022-04-19

## 2022-04-19 RX ORDER — ATORVASTATIN CALCIUM 10 MG/1
10 TABLET, FILM COATED ORAL DAILY
Qty: 90 TABLET | Refills: 1 | Status: SHIPPED | OUTPATIENT
Start: 2022-04-19 | End: 2022-07-22 | Stop reason: SDUPTHER

## 2022-04-19 RX ORDER — LISINOPRIL AND HYDROCHLOROTHIAZIDE 20; 25 MG/1; MG/1
1 TABLET ORAL DAILY
Qty: 90 TABLET | Refills: 1 | Status: SHIPPED | OUTPATIENT
Start: 2022-04-19

## 2022-04-19 RX ORDER — METOPROLOL TARTRATE 25 MG/1
25 TABLET, FILM COATED ORAL 2 TIMES DAILY
Qty: 180 TABLET | Refills: 1 | Status: SHIPPED | OUTPATIENT
Start: 2022-04-19 | End: 2022-07-21

## 2022-04-19 RX ORDER — LINAGLIPTIN 5 MG/1
5 TABLET, FILM COATED ORAL DAILY
Qty: 90 TABLET | Refills: 1 | Status: SHIPPED | OUTPATIENT
Start: 2022-04-19

## 2022-04-19 RX ORDER — DULAGLUTIDE 0.75 MG/.5ML
0.75 INJECTION, SOLUTION SUBCUTANEOUS
Qty: 13 EACH | Refills: 0 | Status: SHIPPED | OUTPATIENT
Start: 2022-04-19 | End: 2022-07-20 | Stop reason: SDUPTHER

## 2022-04-19 NOTE — PROGRESS NOTES
1. \"Have you been to the ER, urgent care clinic since your last visit? Hospitalized since your last visit? \" No    2. \"Have you seen or consulted any other health care providers outside of the [address] [place] [address] since your last visit? \" No     3. For patients aged 39-70: Has the patient had a colonoscopy / FIT/ Cologuard? No      If the patient is female:    4. For patients aged 41-77: Has the patient had a mammogram within the past 2 years? No      5. For patients aged 21-65: Has the patient had a pap smear?  NA - based on age or sex     Visit Vitals  BP (!) 166/111 (BP 1 Location: Left arm)   Pulse 72   Temp 98.5 °F (36.9 °C) (Temporal)   Resp 16   Ht 5' 3\" (1.6 m)   Wt 143 lb (64.9 kg)   SpO2 94%   BMI 25.33 kg/m²       Chief Complaint   Patient presents with    Follow-up    Diabetes

## 2022-04-19 NOTE — PROGRESS NOTES
HISTORY OF PRESENT ILLNESS  Mehnaz Santiago is a 80 y.o. female presents for medication refill. Patient is new to me. She typically see's another provider in this office however due to availability of schedule she is following up with me today. Diabetes: Last A1c was   Lab Results   Component Value Date/Time    Hemoglobin A1c 8.4 (H) 08/05/2021 12:00 AM    Patient uses tradjenta, metformin and glyburide. Patient's last eye exam was 2019, due for eye exam. Notes some neuropathy. Hypertension: Patients BP is elevated today. Patient has been using lisinopril-HCTZ and metoprolol. Denies headaches, blurred vision or dizziness. States BP is better controlled at home. Believes BP is elevated today due to being in the office. Patient has only been using metoprolol once a day, was not aware to take this twice a day. Hyperlipidemia: Mixed hyperlipidemia well controlled on atorvastatin. Denies any leg cramps or malaise from this medication. Reported compliance with taking medication daily. GERD:  Patient's acid reflux/GERD is well controlled on current regimen of  lansoprazole. Allergies: Well controlled on singular and allegra. Patient speaks Alme[place], daughter is present during exam today and helps to translate. Preferred to use daughter instead of  service.     Vitals:    04/19/22 1559 04/19/22 1628   BP: (!) 166/111 (!) 168/86   Pulse: 72    Resp: 16    Temp: 98.5 °F (36.9 °C)    TempSrc: Temporal    SpO2: 94%    Weight: 143 lb (64.9 kg)    Height: 5' 3\" (1.6 m)      Patient Active Problem List   Diagnosis Code    Heartburn R12    Asthma J45.909    Diabetes ([facility] [address] E11.9    Essential (primary) hypertension I10    Hyperlipidemia E78.5    Moderate persistent asthma, uncomplicated S75.10    Gastroesophageal reflux disease without esophagitis K21.9     Patient Active Problem List    Diagnosis Date Noted    Hyperlipidemia 09/21/2020    Moderate persistent asthma, uncomplicated 25/20/1374    Gastroesophageal reflux disease without esophagitis 09/21/2020    Heartburn     Asthma     Diabetes ([facility]     Essential (primary) hypertension      Current Outpatient Medications   Medication Sig Dispense Refill    dulaglutide (Trulicity) 9.25 MA/3.4 mL sub-q pen 0.5 mL by SubCUTAneous route every seven (7) days. 13 Each 0    metFORMIN (GLUCOPHAGE) 1,000 mg tablet Take 1 Tablet by mouth two (2) times a day. 180 Tablet 1    atorvastatin (LIPITOR) 10 mg tablet Take 1 Tablet by mouth daily. 90 Tablet 1    fexofenadine (ALLEGRA) 180 mg tablet Take 1 Tablet by mouth daily. 90 Tablet 1    lisinopril-hydroCHLOROthiazide (PRINZIDE, ZESTORETIC) 20-25 mg per tablet Take 1 Tablet by mouth daily. 90 Tablet 1    Tradjenta 5 mg tablet Take 1 Tablet by mouth daily. 90 Tablet 1    montelukast (SINGULAIR) 10 mg tablet Take 1 Tablet by mouth daily. 90 Tablet 1    metoprolol tartrate (LOPRESSOR) 25 mg tablet Take 1 Tablet by mouth two (2) times a day. 180 Tablet 1    True Metrix Glucose Test Strip strip USE 1 STRIP TO CHECK GLUCOSE DAILY. 100 Strip 1    lansoprazole (PREVACID) 30 mg capsule Take 1 capsule by mouth once daily 90 Capsule 1    glyBURIDE (DIABETA) 2.5 mg tablet Take 1 tablet by mouth twice daily 180 Tablet 1    guaiFENesin (Mucinex) 1,200 mg Ta12 ER tablet Take 1,200 mg by mouth two (2) times a day.  fluticasone propionate (FLOVENT HFA) 110 mcg/actuation inhaler Take 1 Puff by inhalation two (2) times a day. 3 Inhaler 1    Ventolin HFA 90 mcg/actuation inhaler Take 1 Puff by inhalation two (2) times a day. 1 Inhaler 2     Allergies   Allergen Reactions    Iodine Rash     Past Medical History:   Diagnosis Date    Asthma     Diabetes ([facility]     Heartburn     Hypercholesterolemia     Hyperlipidemia 9/21/2020    Hypertension      History reviewed. No pertinent surgical history.   Family History   Problem Relation Age of Onset    Diabetes Brother      Social History Tobacco Use    Smoking status: Never Smoker    Smokeless tobacco: Never Used   Substance Use Topics    Alcohol use: Not on file           ROS    Physical Exam  Constitutional:       Appearance: Normal appearance. She is normal weight. HENT:      Head: Normocephalic. Eyes:      Extraocular Movements: Extraocular movements intact. Conjunctiva/sclera: Conjunctivae normal.      Pupils: Pupils are equal, round, and reactive to light. Cardiovascular:      Rate and Rhythm: Normal rate and regular rhythm. Pulses: Normal pulses. Heart sounds: Normal heart sounds. Pulmonary:      Effort: Pulmonary effort is normal.      Breath sounds: Normal breath sounds. Musculoskeletal:         General: Normal range of motion. Cervical back: Normal range of motion and neck supple. Feet:      Right foot:      Protective Sensation: 8 sites tested. 8 sites sensed. Skin integrity: Skin integrity normal.      Toenail Condition: Right toenails are normal.      Left foot:      Protective Sensation: 8 sites tested. 8 sites sensed. Skin integrity: Skin integrity normal.      Toenail Condition: Left toenails are normal.   Skin:     General: Skin is warm and dry. Neurological:      General: No focal deficit present. Mental Status: She is alert and oriented to person, place, and time. Psychiatric:         Mood and Affect: Mood normal.           ASSESSMENT and PLAN  Diagnoses and all orders for this visit:    1. Moderate persistent asthma, uncomplicated  Comments:  well controlled. Orders:  -     fexofenadine (ALLEGRA) 180 mg tablet; Take 1 Tablet by mouth daily. -     montelukast (SINGULAIR) 10 mg tablet; Take 1 Tablet by mouth daily. 2. Type 2 diabetes mellitus with hyperglycemia, without long-term current use of insulin ([facility]  Comments:  Add on stephanie, education provided with fernandez and teach back today. Due for eye exam, she will schedule.    Orders:  -     dulaglutide (Trulicity) 0.75 mg/0.5 mL sub-q pen; 0.5 mL by SubCUTAneous route every seven (7) days. -     metFORMIN (GLUCOPHAGE) 1,000 mg tablet; Take 1 Tablet by mouth two (2) times a day. -     Tradjenta 5 mg tablet; Take 1 Tablet by mouth daily.  -     CBC WITH AUTOMATED DIFF  -     METABOLIC PANEL, COMPREHENSIVE  -     HEMOGLOBIN A1C WITH EAG  -     LIPID PANEL  -      DIABETES FOOT EXAM  -     True Metrix Glucose Test Strip strip; USE 1 STRIP TO CHECK GLUCOSE DAILY. 3. Mixed hyperlipidemia  Comments:  will return for fasting labs   Orders:  -     atorvastatin (LIPITOR) 10 mg tablet; Take 1 Tablet by mouth daily.  -     LIPID PANEL    4. Essential (primary) hypertension  Comments: Will start using metoprolol twice a day. Keep BP log and follow up with PCP in 3 months  Orders:  -     lisinopril-hydroCHLOROthiazide (PRINZIDE, ZESTORETIC) 20-25 mg per tablet; Take 1 Tablet by mouth daily. -     metoprolol tartrate (LOPRESSOR) 25 mg tablet; Take 1 Tablet by mouth two (2) times a day. 5. Type 2 diabetes mellitus with hyperglycemia, without long-term current use of insulin (HCC)  -     dulaglutide (Trulicity) 5.42 EE/2.6 mL sub-q pen; 0.5 mL by SubCUTAneous route every seven (7) days. -     metFORMIN (GLUCOPHAGE) 1,000 mg tablet; Take 1 Tablet by mouth two (2) times a day. -     Tradjenta 5 mg tablet; Take 1 Tablet by mouth daily.  -     CBC WITH AUTOMATED DIFF  -     METABOLIC PANEL, COMPREHENSIVE  -     HEMOGLOBIN A1C WITH EAG  -     LIPID PANEL  -      DIABETES FOOT EXAM  -     True Metrix Glucose Test Strip strip; USE 1 STRIP TO CHECK GLUCOSE DAILY. On this date 4/19/2022 I have spent 40 minutes reviewing previous notes, test results and face to face with the patient discussing the diagnosis and plan of care as well as documenting on the day of the visit.       Su Patel NP

## 2022-04-22 LAB
ALBUMIN SERPL-MCNC: 4.5 G/DL (ref 3.8–4.8)
ALBUMIN/GLOB SERPL: 2 {RATIO} (ref 1.2–2.2)
ALP SERPL-CCNC: 54 IU/L (ref 44–121)
ALT SERPL-CCNC: 15 IU/L (ref 0–32)
AST SERPL-CCNC: 17 IU/L (ref 0–40)
BASOPHILS # BLD AUTO: 0.1 X10E3/UL (ref 0–0.2)
BASOPHILS NFR BLD AUTO: 1 %
BILIRUB SERPL-MCNC: 0.4 MG/DL (ref 0–1.2)
BUN SERPL-MCNC: 26 MG/DL (ref 8–27)
BUN/CREAT SERPL: 31 (ref 12–28)
CALCIUM SERPL-MCNC: 9.8 MG/DL (ref 8.7–10.3)
CHLORIDE SERPL-SCNC: 97 MMOL/L (ref 96–106)
CHOLEST SERPL-MCNC: 170 MG/DL (ref 100–199)
CO2 SERPL-SCNC: 24 MMOL/L (ref 20–29)
CREAT SERPL-MCNC: 0.85 MG/DL (ref 0.57–1)
EGFR: 74 ML/MIN/1.73
EOSINOPHIL # BLD AUTO: 0.3 X10E3/UL (ref 0–0.4)
EOSINOPHIL NFR BLD AUTO: 4 %
ERYTHROCYTE [DISTWIDTH] IN BLOOD BY AUTOMATED COUNT: 12.8 % (ref 11.7–15.4)
EST. AVERAGE GLUCOSE BLD GHB EST-MCNC: 212 MG/DL
GLOBULIN SER CALC-MCNC: 2.3 G/DL (ref 1.5–4.5)
GLUCOSE SERPL-MCNC: 210 MG/DL (ref 65–99)
HBA1C MFR BLD: 9 % (ref 4.8–5.6)
HCT VFR BLD AUTO: 35.6 % (ref 34–46.6)
HDLC SERPL-MCNC: 51 MG/DL
HGB BLD-MCNC: 11.7 G/DL (ref 11.1–15.9)
IMM GRANULOCYTES # BLD AUTO: 0 X10E3/UL (ref 0–0.1)
IMM GRANULOCYTES NFR BLD AUTO: 0 %
LDLC SERPL CALC-MCNC: 84 MG/DL (ref 0–99)
LYMPHOCYTES # BLD AUTO: 2.1 X10E3/UL (ref 0.7–3.1)
LYMPHOCYTES NFR BLD AUTO: 33 %
MCH RBC QN AUTO: 29.5 PG (ref 26.6–33)
MCHC RBC AUTO-ENTMCNC: 32.9 G/DL (ref 31.5–35.7)
MCV RBC AUTO: 90 FL (ref 79–97)
MONOCYTES # BLD AUTO: 0.7 X10E3/UL (ref 0.1–0.9)
MONOCYTES NFR BLD AUTO: 10 %
NEUTROPHILS # BLD AUTO: 3.4 X10E3/UL (ref 1.4–7)
NEUTROPHILS NFR BLD AUTO: 52 %
PLATELET # BLD AUTO: 265 X10E3/UL (ref 150–450)
POTASSIUM SERPL-SCNC: 4.9 MMOL/L (ref 3.5–5.2)
PROT SERPL-MCNC: 6.8 G/DL (ref 6–8.5)
RBC # BLD AUTO: 3.96 X10E6/UL (ref 3.77–5.28)
SODIUM SERPL-SCNC: 138 MMOL/L (ref 134–144)
TRIGL SERPL-MCNC: 209 MG/DL (ref 0–149)
VLDLC SERPL CALC-MCNC: 35 MG/DL (ref 5–40)
WBC # BLD AUTO: 6.5 X10E3/UL (ref 3.4–10.8)

## 2022-07-20 DIAGNOSIS — E11.65 TYPE 2 DIABETES MELLITUS WITH HYPERGLYCEMIA, WITHOUT LONG-TERM CURRENT USE OF INSULIN (HCC): ICD-10-CM

## 2022-07-20 RX ORDER — DULAGLUTIDE 0.75 MG/.5ML
0.75 INJECTION, SOLUTION SUBCUTANEOUS
Qty: 13 EACH | Refills: 0 | Status: SHIPPED | OUTPATIENT
Start: 2022-07-20 | End: 2022-07-21 | Stop reason: SDUPTHER

## 2022-07-21 ENCOUNTER — OFFICE VISIT (OUTPATIENT)
Dept: PRIMARY CARE CLINIC | Age: 72
End: 2022-07-21
Payer: MEDICARE

## 2022-07-21 VITALS
WEIGHT: 139.4 LBS | RESPIRATION RATE: 20 BRPM | HEIGHT: 63 IN | BODY MASS INDEX: 24.7 KG/M2 | HEART RATE: 67 BPM | DIASTOLIC BLOOD PRESSURE: 85 MMHG | TEMPERATURE: 97.4 F | OXYGEN SATURATION: 98 % | SYSTOLIC BLOOD PRESSURE: 138 MMHG

## 2022-07-21 DIAGNOSIS — I10 ESSENTIAL (PRIMARY) HYPERTENSION: ICD-10-CM

## 2022-07-21 DIAGNOSIS — J45.40 MODERATE PERSISTENT ASTHMA, UNCOMPLICATED: ICD-10-CM

## 2022-07-21 DIAGNOSIS — Z12.31 ENCOUNTER FOR SCREENING MAMMOGRAM FOR MALIGNANT NEOPLASM OF BREAST: ICD-10-CM

## 2022-07-21 DIAGNOSIS — Z23 ENCOUNTER FOR IMMUNIZATION: ICD-10-CM

## 2022-07-21 DIAGNOSIS — Z12.11 SCREENING FOR MALIGNANT NEOPLASM OF COLON: ICD-10-CM

## 2022-07-21 DIAGNOSIS — E11.65 TYPE 2 DIABETES MELLITUS WITH HYPERGLYCEMIA, WITHOUT LONG-TERM CURRENT USE OF INSULIN (HCC): ICD-10-CM

## 2022-07-21 DIAGNOSIS — Z00.00 MEDICARE ANNUAL WELLNESS VISIT, SUBSEQUENT: Primary | ICD-10-CM

## 2022-07-21 DIAGNOSIS — E78.2 MIXED HYPERLIPIDEMIA: ICD-10-CM

## 2022-07-21 DIAGNOSIS — Z78.0 POSTMENOPAUSAL: ICD-10-CM

## 2022-07-21 PROCEDURE — 1090F PRES/ABSN URINE INCON ASSESS: CPT | Performed by: FAMILY MEDICINE

## 2022-07-21 PROCEDURE — 3017F COLORECTAL CA SCREEN DOC REV: CPT | Performed by: FAMILY MEDICINE

## 2022-07-21 PROCEDURE — 3046F HEMOGLOBIN A1C LEVEL >9.0%: CPT | Performed by: FAMILY MEDICINE

## 2022-07-21 PROCEDURE — 1123F ACP DISCUSS/DSCN MKR DOCD: CPT | Performed by: FAMILY MEDICINE

## 2022-07-21 PROCEDURE — G8510 SCR DEP NEG, NO PLAN REQD: HCPCS | Performed by: FAMILY MEDICINE

## 2022-07-21 PROCEDURE — G9899 SCRN MAM PERF RSLTS DOC: HCPCS | Performed by: FAMILY MEDICINE

## 2022-07-21 PROCEDURE — 90715 TDAP VACCINE 7 YRS/> IM: CPT | Performed by: FAMILY MEDICINE

## 2022-07-21 PROCEDURE — G8400 PT W/DXA NO RESULTS DOC: HCPCS | Performed by: FAMILY MEDICINE

## 2022-07-21 PROCEDURE — 2022F DILAT RTA XM EVC RTNOPTHY: CPT | Performed by: FAMILY MEDICINE

## 2022-07-21 PROCEDURE — G8752 SYS BP LESS 140: HCPCS | Performed by: FAMILY MEDICINE

## 2022-07-21 PROCEDURE — G8754 DIAS BP LESS 90: HCPCS | Performed by: FAMILY MEDICINE

## 2022-07-21 PROCEDURE — G8427 DOCREV CUR MEDS BY ELIG CLIN: HCPCS | Performed by: FAMILY MEDICINE

## 2022-07-21 PROCEDURE — G8536 NO DOC ELDER MAL SCRN: HCPCS | Performed by: FAMILY MEDICINE

## 2022-07-21 PROCEDURE — G8420 CALC BMI NORM PARAMETERS: HCPCS | Performed by: FAMILY MEDICINE

## 2022-07-21 PROCEDURE — 1101F PT FALLS ASSESS-DOCD LE1/YR: CPT | Performed by: FAMILY MEDICINE

## 2022-07-21 PROCEDURE — 99214 OFFICE O/P EST MOD 30 MIN: CPT | Performed by: FAMILY MEDICINE

## 2022-07-21 PROCEDURE — G0439 PPPS, SUBSEQ VISIT: HCPCS | Performed by: FAMILY MEDICINE

## 2022-07-21 RX ORDER — LISINOPRIL 20 MG/1
20 TABLET ORAL DAILY
Qty: 90 TABLET | Refills: 1 | Status: SHIPPED | OUTPATIENT
Start: 2022-07-21 | End: 2022-08-02 | Stop reason: SDUPTHER

## 2022-07-21 RX ORDER — DULAGLUTIDE 0.75 MG/.5ML
0.75 INJECTION, SOLUTION SUBCUTANEOUS
Qty: 13 EACH | Refills: 0 | Status: SHIPPED | OUTPATIENT
Start: 2022-07-21 | End: 2022-10-25

## 2022-07-21 RX ORDER — ALBUTEROL SULFATE 90 UG/1
1 AEROSOL, METERED RESPIRATORY (INHALATION) 2 TIMES DAILY
Qty: 1 EACH | Refills: 2 | Status: SHIPPED | OUTPATIENT
Start: 2022-07-21

## 2022-07-21 NOTE — PROGRESS NOTES
HPI     Chief Complaint:   Chief Complaint   Patient presents with    Follow-up          HPI:  Dannielle Huertas is a 80 y.o. female who has a history of T2DM, HTN, HLD, Asthma. T2DM: Has been uncontrolled and I've tried to discuss starting other meds for which patient was very hesitant. In the interim she saw another provider and was started on trulicity, fasting glucose now [id number]; however, trulicity is costing about $600 every months, very expensive. They would like alternative. Lab Results   Component Value Date/Time    Hemoglobin A1c 9.0 (H) 04/21/2022 08:08 AM      Diabetes Checklist  ACE inhibitor: taking  Last Lipid panel: UTD  Statin: yes  Last Microalbumin: reviewed  Pneumonia vaccine 19-64?: reviewed  Last seen by opthalmology: reviewed    HTN: Has been elevated. Saw another provider who started her on metoprolol. BP better today but she has noticed she is using inhaler more and actually needs a refill. HLD: On statin. No myalgias. Lab Results   Component Value Date/Time    Cholesterol, total 170 04/21/2022 08:08 AM    HDL Cholesterol 51 04/21/2022 08:08 AM    LDL, calculated 84 04/21/2022 08:08 AM    VLDL, calculated 35 04/21/2022 08:08 AM    Triglyceride 209 (H) 04/21/2022 08:08 AM       Moderate Persistent Asthma: chronic. On ventolin and singulair. Denies dyspnea or wheeze currently. Review of Systems   Constitutional:  Negative for chills and fever. Respiratory:  Negative for cough and shortness of breath. Cardiovascular:  Negative for chest pain and palpitations. Psychiatric/Behavioral:  Negative for hallucinations and substance abuse. Reviewed PmHx, FmHx, SocHx as well as meds and allergies, updated and dated in the chart.     Past Medical History:   Diagnosis Date    Asthma     Diabetes ([facility]     Heartburn     Hypercholesterolemia     Hyperlipidemia 9/21/2020    Hypertension      Social History     Tobacco Use    Smoking status: Never    Smokeless tobacco: Never Current Outpatient Medications on File Prior to Visit   Medication Sig Dispense Refill    metFORMIN (GLUCOPHAGE) 1,000 mg tablet Take 1 Tablet by mouth two (2) times a day. 180 Tablet 1    atorvastatin (LIPITOR) 10 mg tablet Take 1 Tablet by mouth daily. 90 Tablet 1    fexofenadine (ALLEGRA) 180 mg tablet Take 1 Tablet by mouth daily. 90 Tablet 1    lisinopril-hydroCHLOROthiazide (PRINZIDE, ZESTORETIC) 20-25 mg per tablet Take 1 Tablet by mouth daily. 90 Tablet 1    Tradjenta 5 mg tablet Take 1 Tablet by mouth daily. 90 Tablet 1    montelukast (SINGULAIR) 10 mg tablet Take 1 Tablet by mouth daily. 90 Tablet 1    True Metrix Glucose Test Strip strip USE 1 STRIP TO CHECK GLUCOSE DAILY. 100 Strip 1    lansoprazole (PREVACID) 30 mg capsule Take 1 capsule by mouth once daily 90 Capsule 1    glyBURIDE (DIABETA) 2.5 mg tablet Take 1 tablet by mouth twice daily 180 Tablet 1    guaiFENesin (Mucinex) 1,200 mg Ta12 ER tablet Take 1,200 mg by mouth two (2) times a day.  fluticasone propionate (FLOVENT HFA) 110 mcg/actuation inhaler Take 1 Puff by inhalation two (2) times a day. 3 Inhaler 1    [DISCONTINUED] dulaglutide (Trulicity) 1.85 ZX/3.5 mL sub-q pen 0.5 mL by SubCUTAneous route every seven (7) days. 13 Each 0    [DISCONTINUED] metoprolol tartrate (LOPRESSOR) 25 mg tablet Take 1 Tablet by mouth two (2) times a day. 180 Tablet 1    [DISCONTINUED] Ventolin HFA 90 mcg/actuation inhaler Take 1 Puff by inhalation two (2) times a day. 1 Inhaler 2     No current facility-administered medications on file prior to visit. Allergies   Allergen Reactions    Iodine Rash          Objective     Visit Vitals  /85 (BP 1 Location: Right upper arm, BP Patient Position: Sitting, BP Cuff Size: Adult)   Pulse 67   Temp 97.4 °F (36.3 °C) (Temporal)   Resp 20   Ht 5' 3\" (1.6 m)   Wt 139 lb 6.4 oz (63.2 kg)   SpO2 98%   BMI 24.69 kg/m²       Physical Exam  Vitals and nursing note reviewed.    Constitutional: Appearance: Normal appearance. Cardiovascular:      Rate and Rhythm: Normal rate and regular rhythm. Heart sounds: Normal heart sounds. Pulmonary:      Effort: Pulmonary effort is normal. No respiratory distress. Breath sounds: Normal breath sounds. Neurological:      General: No focal deficit present. Mental Status: She is alert and oriented to person, place, and time. Assessment and Plan     Diagnoses and all orders for this visit:    1. Medicare annual wellness visit, subsequent  Comments:  Age appropriate guidance given and HM updated. Orders:  -     [facility] MAMMO BI SCREENING INCL CAD; Future    2. Type 2 diabetes mellitus with hyperglycemia, without long-term current use of insulin (HCC)  Comments:  Lab check. Will see if alternate GLP-1 covered by insurance. Orders:  -     CBC W/O DIFF  -     LIPID PANEL  -     HEMOGLOBIN A1C WITH EAG  -     METABOLIC PANEL, COMPREHENSIVE  -     dulaglutide (Trulicity) 0.74 EI/3.9 mL sub-q pen; 0.5 mL by SubCUTAneous route every seven (7) days. 3. Essential (primary) hypertension  Comments:  No hx of cardiac disease. Given age and comorbidities, stopping Metoprolol. +dose of lisinopril 20mg daily. Orders:  -     CBC W/O DIFF  -     LIPID PANEL  -     METABOLIC PANEL, COMPREHENSIVE  -     lisinopriL (PRINIVIL, ZESTRIL) 20 mg tablet; Take 1 Tablet by mouth in the morning. 4. Mixed hyperlipidemia  Comments:  Continue statin. Checking labs today, fasting. Orders:  -     LIPID PANEL  -     METABOLIC PANEL, COMPREHENSIVE    5. Moderate persistent asthma, uncomplicated  Comments:  Continue current regimen. Orders:  -     Ventolin HFA 90 mcg/actuation inhaler; Take 1 Puff by inhalation two (2) times a day. 6. Screening for malignant neoplasm of colon  -     REFERRAL TO GASTROENTEROLOGY    7. Encounter for screening mammogram for malignant neoplasm of breast  -     [facility] MAMMO BI SCREENING INCL CAD; Future    8.  Postmenopausal  -     DEXA BONE DENSITY STUDY AXIAL; Future    9. Encounter for immunization  -     TDAP, [address], (AGE 10 YRS+), IM                 I have discussed the diagnosis with the patient and the intended plan as seen in the above orders. The patient has received an after-visit summary and questions were answered concerning future plans. I have discussed medication side effects and warnings with the patient as well.       Marcela Guaamn MD  [address] [place] [address]

## 2022-07-21 NOTE — PATIENT INSTRUCTIONS
Stop metoprolol. Take the prinzide in the morning. Take NEW medication, Lisinopril 20mg in the evening.

## 2022-07-21 NOTE — PROGRESS NOTES
1. \"Have you been to the ER, urgent care clinic since your last visit? Hospitalized since your last visit? \" No    2. \"Have you seen or consulted any other health care providers outside of the [address] [place] [address] since your last visit? \" No     3. For patients aged 39-70: Has the patient had a colonoscopy / FIT/ Cologuard? Yes - Care Gap present. Most recent result on file      If the patient is female:    4. For patients aged 41-77: Has the patient had a mammogram within the past 2 years? Yes - Care Gap present. Most recent result on file      5. For patients aged 21-65: Has the patient had a pap smear?  NA - based on age or sex  Chief Complaint   Patient presents with    Follow-up     Visit Vitals  /85 (BP 1 Location: Right upper arm, BP Patient Position: Sitting, BP Cuff Size: Adult)   Pulse 67   Temp 97.4 °F (36.3 °C) (Temporal)   Resp 20   Ht 5' 3\" (1.6 m)   Wt 139 lb 6.4 oz (63.2 kg)   SpO2 98%   BMI 24.69 kg/m²

## 2022-07-21 NOTE — PROGRESS NOTES
[place]sen  1 [facility], [address] [place] [address]  [phone]    Date of visit: 7/21/2022       This is a Subsequent Medicare Annual Wellness Visit (AWV), (Performed more than 12 months after effective date of Medicare Part B enrollment and 12 months after last preventive visit.)    I have reviewed the patient's medical history in detail and updated the computerized patient record. History obtained from: child and the patient. female  80 y.o.    Depression Risk Factor Screening:     3 most recent PHQ Screens 7/21/2022   Little interest or pleasure in doing things Not at all   Feeling down, depressed, irritable, or hopeless Not at all   Total Score PHQ 2 0          Fall Risk Factor Screening:     Fall Risk Assessment, last 12 mths 7/21/2022   Able to walk? Yes   Fall in past 12 months? 0   Do you feel unsteady? 0   Are you worried about falling 0       Alcohol Risk Screen    Do you average more than 1 drink per night or more than 7 drinks a week:  No    On any one occasion in the past three months have you have had more than 3 drinks containing alcohol:  No         Functional Ability and Level of Safety:    Hearing: Hearing is good. Activities of Daily Living: The home contains: no safety equipment. Patient does total self care      Ambulation: with no difficulty      Abuse Screen:  Patient is not abused         Histories     Reviewed PmHx, FmHx, SocHx as well as meds and allergies, updated and dated in the chart. Patient Active Problem List   Diagnosis Code    Heartburn R12    Asthma J45.909    Diabetes ([facility] [address] E11.9    Essential (primary) hypertension I10    Hyperlipidemia E78.5    Moderate persistent asthma, uncomplicated A04.53    Gastroesophageal reflux disease without esophagitis K21.9     Past Medical History:   Diagnosis Date    Asthma     Diabetes ([address]     Heartburn     Hypercholesterolemia     Hyperlipidemia 9/21/2020    Hypertension       History reviewed. No pertinent surgical history. Allergies   Allergen Reactions    Iodine Rash     Current Outpatient Medications   Medication Sig Dispense Refill    Ventolin HFA 90 mcg/actuation inhaler Take 1 Puff by inhalation two (2) times a day. 1 Each 2    dulaglutide (Trulicity) 7.01 WH/1.3 mL sub-q pen 0.5 mL by SubCUTAneous route every seven (7) days. 13 Each 0    lisinopriL (PRINIVIL, ZESTRIL) 20 mg tablet Take 1 Tablet by mouth in the morning. 90 Tablet 1    metFORMIN (GLUCOPHAGE) 1,000 mg tablet Take 1 Tablet by mouth two (2) times a day. 180 Tablet 1    atorvastatin (LIPITOR) 10 mg tablet Take 1 Tablet by mouth daily. 90 Tablet 1    fexofenadine (ALLEGRA) 180 mg tablet Take 1 Tablet by mouth daily. 90 Tablet 1    lisinopril-hydroCHLOROthiazide (PRINZIDE, ZESTORETIC) 20-25 mg per tablet Take 1 Tablet by mouth daily. 90 Tablet 1    Tradjenta 5 mg tablet Take 1 Tablet by mouth daily. 90 Tablet 1    montelukast (SINGULAIR) 10 mg tablet Take 1 Tablet by mouth daily. 90 Tablet 1    True Metrix Glucose Test Strip strip USE 1 STRIP TO CHECK GLUCOSE DAILY. 100 Strip 1    lansoprazole (PREVACID) 30 mg capsule Take 1 capsule by mouth once daily 90 Capsule 1    glyBURIDE (DIABETA) 2.5 mg tablet Take 1 tablet by mouth twice daily 180 Tablet 1    guaiFENesin (Mucinex) 1,200 mg Ta12 ER tablet Take 1,200 mg by mouth two (2) times a day. fluticasone propionate (FLOVENT HFA) 110 mcg/actuation inhaler Take 1 Puff by inhalation two (2) times a day. 3 Inhaler 1     Family History   Problem Relation Age of Onset    Diabetes Brother      Social History     Tobacco Use    Smoking status: Never    Smokeless tobacco: Never   Substance Use Topics    Alcohol use: Not on file       Current Complaints and Pertinent Exam   If patient is due for chronic medical conditions and/or has acute concerns in addition to the medicare wellness visit, they have been informed there may be a copay for the additional services provided, and agree to do both. ROS & Physical Exam: please see acute note if applicable      Diet and Exercise: patient walks daily and eats a diet full of variety of fruits and vegetables. BP Readings from Last 3 Encounters:   07/21/22 138/85   04/19/22 (!) 168/86   03/05/21 (!) 172/95      Wt Readings from Last 3 Encounters:   07/21/22 139 lb 6.4 oz (63.2 kg)   04/19/22 143 lb (64.9 kg)   08/05/21 141 lb (64 kg)     Body mass index is 24.69 kg/m². No results found.     Was the patient's timed Up & Go test unsteady or longer than 30 seconds? no    Evaluation of Cognitive Function   Mood/affect:  neutral  Orientation: Person, Place, Time, and Situation  Appearance: age appropriate and casually dressed  Family member/caregiver input: no cognitive concerns    Specialists/Care Team   James Pinzon has established care with the following healthcare providers:  Patient Care Team:  Kalina Ely MD as PCP - General (Family Medicine)  Kalina Ely MD as PCP - [address] [place] [address] [facility]     [facility] Maintenance Topics with due status: Overdue       Topic Date Due    Eye Exam Retinal or Dilated Never done    Colorectal Cancer Screening Combo Never done    Breast Cancer Screen Mammogram Never done    Bone Densitometry (Dexa) Screening Never done    A1C test (Diabetic or Prediabetic) 07/21/2022     Health Maintenance Topics with due status: Due Soon       Topic Date Due    MICROALBUMIN Q1 08/05/2022     Health Maintenance Topics with due status: Not Due       Topic Last Completion Date    Flu Vaccine 09/15/2020    Foot Exam Q1 04/19/2022    Lipid Screen 04/21/2022    DTaP/Tdap/Td series 07/21/2022    Medicare Yearly Exam 07/21/2022    Depression Screen 07/21/2022     Health Maintenance Topics with due status: Completed       Topic Last Completion Date    Shingrix Vaccine Age 50> 11/07/2019    Pneumococcal 65+ years 03/12/2020    Hepatitis C Screening 08/05/2021    COVID-19 Vaccine 06/11/2022         Colon cancer: Recommendation: Colonoscopy every 10y or annual FIT test from 50-75 or every 3 year stool DNA based test with consideration of ongoing screening from 76-85. Lung cancer (LDCT): Recommendation: Yearly LDCT for pts 55-77 w 30-pack year hx and currently smoke or quit <15 yr ago. Hepatitis C:  Recommendation: One time screening for all patient's aged 18-79. Diabetes:   Recommendation: USPSTF recommends screening ages 38-67 y/o if overweight or obese. Medicare covers screening in those patients who are overweight, obese, have HTN or dyslipiemia, a personal history of gestational diabetes or prior elevated blood sugar, a family hx of DM, or any patient over age 72    Lipids:   Recommendation: screening for hyperlipidemia every 5 years after age 39        PREVENTIVE CARE - FEMALE SCREENINGS     Cervical cancer: Recommendation: Every 3 yr from 21-29 and every 5 yr from 33-67, with Pap and HPV testing. and Not Indicated    Breast cancer: Recommendation:  USPSTF recommends screening mammography every 2 yr from 54-69. The decision to start screening mammography in women prior to age 48 years should be an individual one. Women who place a higher value on the potential benefit than the potential harms may choose to begin biennial screening between the ages of 36 and 52 years. Medicare covers annual screening mammography and USPSTF also recommends women with a personal or family history of breast, ovarian, tubal, or peritoneal cancer or who have an ancestry ([address]) associated with breast cancer susceptibility 1 and 2 (BRCA1/2) gene mutations with an appropriate brief familial risk assessment tool.  Women with a positive result on the risk assessment tool should receive genetic counseling and, if indicated after counseling, genetic testing    Osteoporosis: Recommendation: Screen all women at 72, earlier if elevated risk    AAA:   Recommendation: One-time screening if family history of AAA and Not Indicated      IMMUNIZATIONS     Immunization History   Administered Date(s) Administered    COVID-19, MODERNA BLUE border, Primary or Immunocompromised, (age 18y+), IM, 100 mcg/0.5mL 02/11/2021, 03/18/2021, 11/02/2021, 06/11/2022    Hib 11/03/2011    Influenza Vaccine 10/10/2018    Influenza, High-dose, Quadrivalent (>65 Yrs Fluzone High Dose Quad 32376) 09/15/2020    Meningococcal (MCV4) Vaccine 11/03/2011    Pneumococcal Conjugate (PCV-13) 03/12/2020    Pneumococcal Polysaccharide (PPSV-23) 11/03/2011, 09/19/2018    Tdap 07/21/2022    Zoster Recombinant 08/27/2019, 11/07/2019       Pneumovax:   Recommendation: PPSV23 once for all >65 and high risk <65  and Up to date or Completed    Prevnar:   Recommendation: PCV13 only if >65 and immunocompromised or residing in a nursing home, or in areas of low childhood Pneumococcal vaccination and Up to date or Completed    Influenza:   Recommendation: Vaccination annually, high dose if 72 or older    Shingrix:  Recommendation: Vaccination 2 shots 2-6 months apart for all age >47 and Up to date or Completed    TDaP:    Recommendation: Vaccination Booster with TDaP every 10 yr. and given in office today. Discussion of Advance Directive   Discussed with Ingrid Shabana her ability to prepare and advance directive in the case that an injury or illness causes her to be unable to make health care decisions. Date of ACP Conversation: 07/21/22  Persons included in Conversation:  patient and family  Length of ACP Conversation in minutes:  <16 minutes (Non-Billable)    Authorized Decision Maker (if patient is incapable of making informed decisions):    This person is:   Next of Kin by law (only applies in absence of a Healthcare Power of  or Legal Guardian)      Primary Decision Maker: Zenobia Paulino - Daughter - [phone]        For Patients with Decision Making Capacity:   Values/Goals: Exploration of values, goals, and preferences if recovery is not expected, even with continued medical treatment in the event of:  Imminent death  Severe, permanent brain injury    Conversation Outcomes / Follow-Up Plan:   ACP in process - information provided, considering goals and options    Assessment/Plan     Diagnoses and all orders for this visit:    1. Medicare annual wellness visit, subsequent  Comments:  Age appropriate guidance given and HM updated. Orders:  -     JAY[facility]E MAMMO BI SCREENING INCL CAD; Future    2. Type 2 diabetes mellitus with hyperglycemia, without long-term current use of insulin (HCC)  Comments:  Lab check. Will see if alternate GLP-1 covered by insurance. Orders:  -     CBC W/O DIFF  -     LIPID PANEL  -     HEMOGLOBIN A1C WITH EAG  -     METABOLIC PANEL, COMPREHENSIVE  -     dulaglutide (Trulicity) 4.98 DR/5.5 mL sub-q pen; 0.5 mL by SubCUTAneous route every seven (7) days. 3. Essential (primary) hypertension  Comments:  No hx of cardiac disease. Given age and comorbidities, stopping Metoprolol. +dose of lisinopril 20mg daily. Orders:  -     CBC W/O DIFF  -     LIPID PANEL  -     METABOLIC PANEL, COMPREHENSIVE  -     lisinopriL (PRINIVIL, ZESTRIL) 20 mg tablet; Take 1 Tablet by mouth in the morning. 4. Mixed hyperlipidemia  Comments:  Continue statin. Checking labs today, fasting. Orders:  -     LIPID PANEL  -     METABOLIC PANEL, COMPREHENSIVE    5. Moderate persistent asthma, uncomplicated  Comments:  Continue current regimen. Orders:  -     Ventolin HFA 90 mcg/actuation inhaler; Take 1 Puff by inhalation two (2) times a day. 6. Screening for malignant neoplasm of colon  -     REFERRAL TO GASTROENTEROLOGY    7. Encounter for screening mammogram for malignant neoplasm of breast  -     [facility] MAMMO BI SCREENING INCL CAD; Future    8. Postmenopausal  -     DEXA BONE DENSITY STUDY AXIAL; Future    9.  Encounter for immunization  -     TDAP, [address], (AGE 10 YRS+), IM        Follow-up and Dispositions    Return in about 6 months (around 1/21/2023) for chronic follow up, DM, HTN, HLD.          Sebastian Majano MD  [facility]

## 2022-07-22 DIAGNOSIS — E78.2 MIXED HYPERLIPIDEMIA: ICD-10-CM

## 2022-07-22 DIAGNOSIS — I10 ESSENTIAL (PRIMARY) HYPERTENSION: ICD-10-CM

## 2022-07-22 LAB
ALBUMIN SERPL-MCNC: 4.5 G/DL (ref 3.8–4.8)
ALBUMIN/GLOB SERPL: 1.8 {RATIO} (ref 1.2–2.2)
ALP SERPL-CCNC: 55 IU/L (ref 44–121)
ALT SERPL-CCNC: 14 IU/L (ref 0–32)
AST SERPL-CCNC: 14 IU/L (ref 0–40)
BILIRUB SERPL-MCNC: 0.4 MG/DL (ref 0–1.2)
BUN SERPL-MCNC: 18 MG/DL (ref 8–27)
BUN/CREAT SERPL: 21 (ref 12–28)
CALCIUM SERPL-MCNC: 9.9 MG/DL (ref 8.7–10.3)
CHLORIDE SERPL-SCNC: 99 MMOL/L (ref 96–106)
CHOLEST SERPL-MCNC: 172 MG/DL (ref 100–199)
CO2 SERPL-SCNC: 22 MMOL/L (ref 20–29)
CREAT SERPL-MCNC: 0.87 MG/DL (ref 0.57–1)
EGFR: 72 ML/MIN/1.73
ERYTHROCYTE [DISTWIDTH] IN BLOOD BY AUTOMATED COUNT: 13.2 % (ref 11.7–15.4)
EST. AVERAGE GLUCOSE BLD GHB EST-MCNC: 180 MG/DL
GLOBULIN SER CALC-MCNC: 2.5 G/DL (ref 1.5–4.5)
GLUCOSE SERPL-MCNC: 142 MG/DL (ref 65–99)
HBA1C MFR BLD: 7.9 % (ref 4.8–5.6)
HCT VFR BLD AUTO: 36.2 % (ref 34–46.6)
HDLC SERPL-MCNC: 55 MG/DL
HGB BLD-MCNC: 12 G/DL (ref 11.1–15.9)
LDLC SERPL CALC-MCNC: 85 MG/DL (ref 0–99)
MCH RBC QN AUTO: 29.4 PG (ref 26.6–33)
MCHC RBC AUTO-ENTMCNC: 33.1 G/DL (ref 31.5–35.7)
MCV RBC AUTO: 89 FL (ref 79–97)
PLATELET # BLD AUTO: 282 X10E3/UL (ref 150–450)
POTASSIUM SERPL-SCNC: 4.6 MMOL/L (ref 3.5–5.2)
PROT SERPL-MCNC: 7 G/DL (ref 6–8.5)
RBC # BLD AUTO: 4.08 X10E6/UL (ref 3.77–5.28)
SODIUM SERPL-SCNC: 139 MMOL/L (ref 134–144)
TRIGL SERPL-MCNC: 187 MG/DL (ref 0–149)
VLDLC SERPL CALC-MCNC: 32 MG/DL (ref 5–40)
WBC # BLD AUTO: 5.9 X10E3/UL (ref 3.4–10.8)

## 2022-07-22 RX ORDER — ATORVASTATIN CALCIUM 20 MG/1
20 TABLET, FILM COATED ORAL DAILY
Qty: 90 TABLET | Refills: 1 | Status: SHIPPED | OUTPATIENT
Start: 2022-07-22

## 2022-07-22 NOTE — PROGRESS NOTES
Pls call patient  DM- still not at goal, but better. Let's increase the trulicity to 3.3IU weekly. Continue the metformin and glyburide. (Note for self-A1c may also improve some more now that metoprolol stopped)    Triglycerides still high and LDL >70. I would like to increase atorvastatin to 20mg from 10mg. Continue to work on [employer] CBC and remaining electrolytes,kidney/liver function WNL.      Dr. Bal Beach

## 2022-07-25 ENCOUNTER — TELEPHONE (OUTPATIENT)
Dept: PRIMARY CARE CLINIC | Age: 72
End: 2022-07-25

## 2022-07-25 NOTE — TELEPHONE ENCOUNTER
Pt wants to know which medication is best for her mothers diabetes. Pt would like a call back regarding her mother.

## 2022-07-26 NOTE — PROGRESS NOTES
Called and talked with daughter. Would like something different then Trulicity due to cost. Patient is currently taking Metoprolol and Lisinopril-hctz pre daughter.

## 2022-07-28 NOTE — PROGRESS NOTES
Called and spoke with patient's daughter, Patient is to do lisinopril-HCTZ in the morning and I added an additional dose of lisinopril only to the evening, 20mg. Stop Metoprolol medication. Ozempic, patient will try.

## 2022-08-01 ENCOUNTER — TELEPHONE (OUTPATIENT)
Dept: PRIMARY CARE CLINIC | Age: 72
End: 2022-08-01

## 2022-08-01 NOTE — TELEPHONE ENCOUNTER
Prescribed new bp medication Lisinopril. She is having chest pain. And is also allergic to dye in pills. The pill is pink. She feels very sick. Thinks she needs to go back to metoprolol. Or something that does not have color.  Wants a call back asap

## 2022-08-02 DIAGNOSIS — I10 ESSENTIAL (PRIMARY) HYPERTENSION: ICD-10-CM

## 2022-08-02 RX ORDER — LISINOPRIL 20 MG/1
20 TABLET ORAL DAILY
Qty: 90 TABLET | Refills: 1 | Status: SHIPPED | OUTPATIENT
Start: 2022-08-02

## 2022-08-05 ENCOUNTER — TELEPHONE (OUTPATIENT)
Dept: PRIMARY CARE CLINIC | Age: 72
End: 2022-08-05

## 2022-08-05 NOTE — TELEPHONE ENCOUNTER
Pt is still having chest pain with new bld pressure medication. She was told to take 1 daily and her chest still hurts. Wants to know if she can be switched back to her original bld pressure medication. Also says that she was on Insulin but due to expenses we were searching for an alternative for her but she hasn't heard anything back yet.

## 2022-08-09 ENCOUNTER — TELEPHONE (OUTPATIENT)
Dept: PRIMARY CARE CLINIC | Age: 72
End: 2022-08-09

## 2022-08-10 DIAGNOSIS — E11.65 TYPE 2 DIABETES MELLITUS WITH HYPERGLYCEMIA, WITHOUT LONG-TERM CURRENT USE OF INSULIN (HCC): ICD-10-CM

## 2022-08-10 RX ORDER — CALCIUM CITRATE/VITAMIN D3 200MG-6.25
TABLET ORAL
Qty: 100 STRIP | Refills: 3 | Status: SHIPPED | OUTPATIENT
Start: 2022-08-10 | End: 2022-09-29 | Stop reason: SDUPTHER

## 2022-09-21 DIAGNOSIS — J45.40 MODERATE PERSISTENT ASTHMA, UNCOMPLICATED: ICD-10-CM

## 2022-09-21 RX ORDER — MONTELUKAST SODIUM 10 MG/1
TABLET ORAL
Qty: 90 TABLET | Refills: 0 | Status: SHIPPED | OUTPATIENT
Start: 2022-09-21

## 2022-09-29 DIAGNOSIS — E11.65 TYPE 2 DIABETES MELLITUS WITH HYPERGLYCEMIA, WITHOUT LONG-TERM CURRENT USE OF INSULIN (HCC): ICD-10-CM

## 2022-09-29 RX ORDER — CALCIUM CITRATE/VITAMIN D3 200MG-6.25
TABLET ORAL
Qty: 100 STRIP | Refills: 3 | Status: SHIPPED | OUTPATIENT
Start: 2022-09-29 | End: 2022-10-12 | Stop reason: SDUPTHER

## 2022-09-29 RX ORDER — INSULIN PUMP SYRINGE, 3 ML
EACH MISCELLANEOUS
Qty: 1 KIT | Refills: 0 | Status: SHIPPED | OUTPATIENT
Start: 2022-09-29 | End: 2022-10-12 | Stop reason: SDUPTHER

## 2022-09-29 RX ORDER — LANCETS
EACH MISCELLANEOUS
Qty: 30 EACH | Refills: 11 | Status: SHIPPED | OUTPATIENT
Start: 2022-09-29 | End: 2022-10-12 | Stop reason: SDUPTHER

## 2022-09-29 NOTE — TELEPHONE ENCOUNTER
Pt daughter called asking if another prescription for glucose monitor, test strips and lancet can be sent in. Pt daughter stated that pt dropped monitor last night and it has broke. no

## 2022-10-05 ENCOUNTER — TELEPHONE (OUTPATIENT)
Dept: PRIMARY CARE CLINIC | Age: 72
End: 2022-10-05

## 2022-11-10 DIAGNOSIS — I10 ESSENTIAL (PRIMARY) HYPERTENSION: ICD-10-CM

## 2022-11-10 RX ORDER — LISINOPRIL 20 MG/1
20 TABLET ORAL DAILY
Qty: 90 TABLET | Refills: 1 | Status: SHIPPED | OUTPATIENT
Start: 2022-11-10

## 2022-11-11 DIAGNOSIS — I10 ESSENTIAL (PRIMARY) HYPERTENSION: ICD-10-CM

## 2022-11-15 RX ORDER — LISINOPRIL AND HYDROCHLOROTHIAZIDE 20; 25 MG/1; MG/1
TABLET ORAL
Qty: 90 TABLET | Refills: 1 | Status: SHIPPED | OUTPATIENT
Start: 2022-11-15

## 2023-01-14 DIAGNOSIS — E11.65 TYPE 2 DIABETES MELLITUS WITH HYPERGLYCEMIA, WITHOUT LONG-TERM CURRENT USE OF INSULIN (HCC): ICD-10-CM

## 2023-01-16 RX ORDER — LINAGLIPTIN 5 MG/1
TABLET, FILM COATED ORAL
Qty: 30 TABLET | Refills: 0 | Status: SHIPPED | OUTPATIENT
Start: 2023-01-16

## 2023-01-18 ENCOUNTER — TELEPHONE (OUTPATIENT)
Dept: PRIMARY CARE CLINIC | Age: 73
End: 2023-01-18

## 2023-01-18 NOTE — TELEPHONE ENCOUNTER
Pt daughter called and stated that insurance is unable to process the medication without a prior authorization. Asking if we can get started as pt is out of the medication.

## 2023-01-20 NOTE — TELEPHONE ENCOUNTER
Insurance is not going to cover the cost of Tradjenta 5MG tablets. Alternative may be available.         Farxiga     Glimepiride     GlipiZIDE     GlipiZIDE ER     GlipiZIDE-MetFORMIN HCl     Glyxambi     Invokana     Janumet     Janumet XR   Januvia     Jardiance     Jentadueto     Jentadueto XR     Levemir FlexTouch     MetFORMIN HCl     MetFORMIN HCl ER     Ozempic (0.25 or 0.5 MG/DOSE)     Pioglitazone HCl       Rybelsus     Trijardy XR

## 2023-01-23 RX ORDER — PIOGLITAZONEHYDROCHLORIDE 15 MG/1
15 TABLET ORAL DAILY
Qty: 30 TABLET | Refills: 1 | Status: SHIPPED | OUTPATIENT
Start: 2023-01-23

## 2023-02-02 ENCOUNTER — TELEPHONE (OUTPATIENT)
Dept: PRIMARY CARE CLINIC | Age: 73
End: 2023-02-02

## 2023-02-02 ENCOUNTER — OFFICE VISIT (OUTPATIENT)
Dept: PRIMARY CARE CLINIC | Age: 73
End: 2023-02-02
Payer: MEDICARE

## 2023-02-02 VITALS
RESPIRATION RATE: 18 BRPM | HEART RATE: 68 BPM | WEIGHT: 137.8 LBS | DIASTOLIC BLOOD PRESSURE: 82 MMHG | TEMPERATURE: 97.4 F | HEIGHT: 63 IN | BODY MASS INDEX: 24.41 KG/M2 | SYSTOLIC BLOOD PRESSURE: 138 MMHG | OXYGEN SATURATION: 98 %

## 2023-02-02 DIAGNOSIS — E11.65 TYPE 2 DIABETES MELLITUS WITH HYPERGLYCEMIA, WITHOUT LONG-TERM CURRENT USE OF INSULIN (HCC): Primary | ICD-10-CM

## 2023-02-02 DIAGNOSIS — K21.9 GASTROESOPHAGEAL REFLUX DISEASE WITHOUT ESOPHAGITIS: ICD-10-CM

## 2023-02-02 DIAGNOSIS — J45.40 MODERATE PERSISTENT ASTHMA, UNCOMPLICATED: ICD-10-CM

## 2023-02-02 DIAGNOSIS — I10 ESSENTIAL (PRIMARY) HYPERTENSION: ICD-10-CM

## 2023-02-02 DIAGNOSIS — E78.2 MIXED HYPERLIPIDEMIA: ICD-10-CM

## 2023-02-02 DIAGNOSIS — K59.00 CONSTIPATION, UNSPECIFIED CONSTIPATION TYPE: ICD-10-CM

## 2023-02-02 RX ORDER — POLYETHYLENE GLYCOL 3350 17 G/17G
17 POWDER, FOR SOLUTION ORAL
Qty: 60 PACKET | Refills: 0 | Status: SHIPPED | OUTPATIENT
Start: 2023-02-02

## 2023-02-02 RX ORDER — FLUTICASONE PROPIONATE AND SALMETEROL 100; 50 UG/1; UG/1
1 POWDER RESPIRATORY (INHALATION) 2 TIMES DAILY
Qty: 60 EACH | Refills: 2 | Status: SHIPPED | OUTPATIENT
Start: 2023-02-02

## 2023-02-02 NOTE — TELEPHONE ENCOUNTER
Provider: Dr. Kae Hopkins?: []Yes []No [x]Not Needed  Date: May 5th   Time: 9 am   Visit Type: Physical  Notes: Medicare wellness and COV 40 minutes

## 2023-02-02 NOTE — PROGRESS NOTES
Identified Patient with two Patient identifiers(name and ). 1. \"Have you been to the ER, urgent care clinic since your last visit? Hospitalized since your last visit? \" No    2. \"Have you seen or consulted any other health care providers outside of the [address] [place] [address] since your last visit? \" No     3. For patients aged 39-70: Has the patient had a colonoscopy / FIT/ Cologuard? Yes - Care Gap present. Most recent result on file      If the patient is female:    4. For patients aged 41-77: Has the patient had a mammogram within the past 2 years? Yes - Care Gap present. Most recent result on file      5. For patients aged 21-65: Has the patient had a pap smear?  NA - based on age or sex     Visit Vitals  /82 (BP 1 Location: Left upper arm, BP Patient Position: Sitting, BP Cuff Size: Large adult)   Pulse 68   Temp 97.4 °F (36.3 °C) (Temporal)   Resp 18   Ht 5' 3\" (1.6 m)   Wt 137 lb 12.8 oz (62.5 kg)   SpO2 98%   BMI 24.41 kg/m²       Chief Complaint   Patient presents with    Follow Up Chronic Condition       Health Maintenance Due   Topic Date Due    Eye Exam Retinal or Dilated  Never done    Breast Cancer Screen Mammogram  Never done    Bone Densitometry (Dexa) Screening  Never done    Flu Vaccine (1) 2022    Diabetic Alb to Cr ratio (uACR) test  2022    COVID-19 Vaccine (5 - Booster for Moderna series) 2022

## 2023-02-02 NOTE — PROGRESS NOTES
HPI     Chief Complaint:   Chief Complaint   Patient presents with    Follow Up Chronic Condition        HPI:  Alta Macario is a 71 y.o. female who has a history of T2DM, HTN, HLD, Asthma. Constipation: only using the bathroom every 2-3 days. No melena or abdominal pain. She is drinking water. She has not tried anything. Last colonoscopy September 2022, one 3mm polyp noted. Moderate Persistent Asthma: chronic. On ventolin and singulair but is noticing increased coughing spells this winter. Denies dyspnea or wheeze currently. T2DM: Uncontrolled. Last visit trulicity was increased to 1.5mg She was on tradjenta but insurance would no longer cover it so we had to switch her to actos. Meds: metformin 1000mg BID. Trulicity 3.71TE weekly, actos 15mg daily. Lab Results   Component Value Date/Time    Hemoglobin A1c 7.9 (H) 07/21/2022 08:40 AM      Diabetes Checklist  ACE inhibitor: taking  Last Lipid panel: UTD  Statin: yes  Last Microalbumin: reviewed  Pneumonia vaccine 19-64?: reviewed  Last seen by opthalmology: reviewed    HTN: Last visit we d/c'ed metoprolol which was started by another provider, as patient has no hx of cardiac disease and noticed increased asthma symptoms on metoprolol. Patient was advised to continue lisinopril 20/25mg and take an additional lisinopril 20mg in the morning. She is not taking the additional dose of lisinopril as she says \"I'm allergic\". She says it caused chest pain. She believes she may be allergic to the dye. HLD: LDL above goal last time so atorvastatin increased to 20mg. She tolerates well without myalgias. Lab Results   Component Value Date/Time    Cholesterol, total 172 07/21/2022 08:40 AM    HDL Cholesterol 55 07/21/2022 08:40 AM    LDL, calculated 85 07/21/2022 08:40 AM    VLDL, calculated 32 07/21/2022 08:40 AM    Triglyceride 187 (H) 07/21/2022 08:40 AM       Review of Systems   Constitutional:  Negative for chills and fever.    Respiratory:  Positive for cough. Negative for shortness of breath. Cardiovascular:  Negative for chest pain and palpitations. Psychiatric/Behavioral:  Negative for hallucinations and substance abuse. Reviewed PmHx, FmHx, SocHx as well as meds and allergies, updated and dated in the chart. Past Medical History:   Diagnosis Date    Asthma     Diabetes ([facility] [address]     Heartburn     Hypercholesterolemia     Hyperlipidemia 9/21/2020    Hypertension      Social History     Tobacco Use    Smoking status: Never    Smokeless tobacco: Never   Vaping Use    Vaping Use: Never used   Substance Use Topics    Drug use: Never       Current Outpatient Medications on File Prior to Visit   Medication Sig Dispense Refill    atorvastatin (LIPITOR) 20 mg tablet Take 1 Tablet by mouth nightly. 90 Tablet 1    pioglitazone (ACTOS) 15 mg tablet Take 1 Tablet by mouth daily. 30 Tablet 1    montelukast (SINGULAIR) 10 mg tablet Take 1 Tablet by mouth nightly. 90 Tablet 1    lisinopril-hydroCHLOROthiazide (PRINZIDE, ZESTORETIC) 20-25 mg per tablet Take 1 tablet by mouth once daily 90 Tablet 1    metFORMIN (GLUCOPHAGE) 1,000 mg tablet Take 1 tablet by mouth twice daily 783 Tablet 1    Trulicity 3.97 UT/3.1 mL sub-q pen INJECT 1 SYRINGE(0.5 ML)  SUBCUTANEOUSLY ONCE A WEEK 12 Each 1    lancets misc Use to check blood sugar 30 Each 11    Blood-Glucose Meter monitoring kit Use to test blood sugar 1 Kit 0    True Metrix Glucose Test Strip strip USE 1 STRIP TO CHECK GLUCOSE DAILY. 100 Strip 3    lansoprazole (PREVACID) 30 mg capsule Take 1 capsule by mouth once daily 90 Capsule 1    glyBURIDE (DIABETA) 2.5 mg tablet Take 1 tablet by mouth twice daily 180 Tablet 1    fexofenadine (ALLEGRA) 180 mg tablet Take 1 Tablet by mouth daily. 90 Tablet 1    fluticasone propionate (FLOVENT HFA) 110 mcg/actuation inhaler Take 1 Puff by inhalation two (2) times a day.  3 Inhaler 1    atorvastatin (LIPITOR) 10 mg tablet Take 1 tablet by mouth once daily (Patient not taking: Reported on 2/2/2023) 90 Tablet 0    lisinopriL (PRINIVIL, ZESTRIL) 20 mg tablet TAKE 1 TABLET BY MOUTH IN THE MORNING (Patient not taking: Reported on 2/2/2023) 90 Tablet 1    [DISCONTINUED] Ventolin HFA 90 mcg/actuation inhaler Take 1 Puff by inhalation two (2) times a day. 1 Each 2    guaiFENesin (Mucinex) 1,200 mg Ta12 ER tablet Take 1,200 mg by mouth two (2) times a day. (Patient not taking: Reported on 2/2/2023)       No current facility-administered medications on file prior to visit. Allergies   Allergen Reactions    Iodine Rash          Objective     Visit Vitals  /82 (BP 1 Location: Left upper arm, BP Patient Position: Sitting, BP Cuff Size: Large adult)   Pulse 68   Temp 97.4 °F (36.3 °C) (Temporal)   Resp 18   Ht 5' 3\" (1.6 m)   Wt 137 lb 12.8 oz (62.5 kg)   SpO2 98%   BMI 24.41 kg/m²       Physical Exam  Vitals and nursing note reviewed. Constitutional:       Appearance: Normal appearance. HENT:      Head: Normocephalic and atraumatic. Cardiovascular:      Rate and Rhythm: Normal rate and regular rhythm. Heart sounds: Normal heart sounds. Pulmonary:      Effort: Pulmonary effort is normal. No respiratory distress. Breath sounds: Normal breath sounds. Abdominal:      General: Abdomen is flat. There is no distension. Neurological:      General: No focal deficit present. Mental Status: She is alert and oriented to person, place, and time. Psychiatric:         Mood and Affect: Mood normal.         Behavior: Behavior normal.            Assessment and Plan     A total of 40 minutes was spent on the date of service reviewing previous labs and notes in EMR, counseling the patient on plan of care, ordering tests, and documentation. Medications were reviewed at length given multiple medication changes within the last few visits as well as medication intolerances. New concerns addressed and plan of actions discussed with patient. Diagnoses and all orders for this visit:    1.  Type 2 diabetes mellitus with hyperglycemia, without long-term current use of insulin ([facility])  Comments:  A1c today. Patient informed tradjenta no longer on insurance formulary. Daily feet checks. Pt to arrange diabetic eye exam. Med changes TBD based on labs. Orders:  -     METABOLIC PANEL, COMPREHENSIVE  -     LIPID PANEL  -     HEMOGLOBIN A1C WITH EAG    2. Essential (primary) hypertension  Comments: At goal on Prinzide continue 20/25 mg. We will hold additional dose of lisinopril as patient did not tolerate. DASH diet encouraged. Orders:  -     METABOLIC PANEL, COMPREHENSIVE    3. Mixed hyperlipidemia  Comments:  Uncontrolled last check. Checking fasting lipid panel on increased atorvastatin 20 mg dose. Orders:  -     METABOLIC PANEL, COMPREHENSIVE  -     LIPID PANEL    4. Moderate persistent asthma, uncomplicated  Comments:  Not controlled. Start Ventolin. Add Advair twice daily. Orders:  -     fluticasone propion-salmeteroL (ADVAIR/WIXELA) 100-50 mcg/dose diskus inhaler; Take 1 Puff by inhalation two (2) times a day. 5. Gastroesophageal reflux disease without esophagitis  Comments:  Stable. Continue PPI. 6. Constipation, unspecified constipation type  Comments:  New. No red flags and up-to-date on colonoscopy. Adequate fluid intake discussed. MiraLAX as needed prescribed. Orders:  -     polyethylene glycol (MIRALAX) 17 gram packet; Take 1 Packet by mouth two (2) times daily as needed for Constipation. Follow-up and Dispositions    Return in about 4 months (around 6/2/2023[facility] follow up. I have discussed the diagnosis with the patient and the intended plan as seen in the above orders. The patient has received an after-visit summary and questions were answered concerning future plans. I have discussed medication side effects and warnings with the patient as well.       García Pereira MD  [facility]

## 2023-02-03 LAB
ALBUMIN SERPL-MCNC: 4.6 G/DL (ref 3.7–4.7)
ALBUMIN/GLOB SERPL: 1.8 {RATIO} (ref 1.2–2.2)
ALP SERPL-CCNC: 58 IU/L (ref 44–121)
ALT SERPL-CCNC: 11 IU/L (ref 0–32)
AST SERPL-CCNC: 16 IU/L (ref 0–40)
BILIRUB SERPL-MCNC: 0.3 MG/DL (ref 0–1.2)
BUN SERPL-MCNC: 19 MG/DL (ref 8–27)
BUN/CREAT SERPL: 23 (ref 12–28)
CALCIUM SERPL-MCNC: 10.1 MG/DL (ref 8.7–10.3)
CHLORIDE SERPL-SCNC: 97 MMOL/L (ref 96–106)
CHOLEST SERPL-MCNC: 180 MG/DL (ref 100–199)
CO2 SERPL-SCNC: 25 MMOL/L (ref 20–29)
CREAT SERPL-MCNC: 0.84 MG/DL (ref 0.57–1)
EGFRCR SERPLBLD CKD-EPI 2021: 74 ML/MIN/1.73
EST. AVERAGE GLUCOSE BLD GHB EST-MCNC: 183 MG/DL
GLOBULIN SER CALC-MCNC: 2.5 G/DL (ref 1.5–4.5)
GLUCOSE SERPL-MCNC: 102 MG/DL (ref 70–99)
HBA1C MFR BLD: 8 % (ref 4.8–5.6)
HDLC SERPL-MCNC: 58 MG/DL
LDLC SERPL CALC-MCNC: 91 MG/DL (ref 0–99)
POTASSIUM SERPL-SCNC: 4.3 MMOL/L (ref 3.5–5.2)
PROT SERPL-MCNC: 7.1 G/DL (ref 6–8.5)
SODIUM SERPL-SCNC: 137 MMOL/L (ref 134–144)
TRIGL SERPL-MCNC: 181 MG/DL (ref 0–149)
VLDLC SERPL CALC-MCNC: 31 MG/DL (ref 5–40)

## 2023-02-08 DIAGNOSIS — E11.65 TYPE 2 DIABETES MELLITUS WITH HYPERGLYCEMIA, WITHOUT LONG-TERM CURRENT USE OF INSULIN (HCC): ICD-10-CM

## 2023-02-09 ENCOUNTER — TELEPHONE (OUTPATIENT)
Dept: PRIMARY CARE CLINIC | Age: 73
End: 2023-02-09

## 2023-02-09 NOTE — TELEPHONE ENCOUNTER
Patient daughter notified of lab results, also made aware of the dosage being upped on medication and when atorvastatin is to be taken.

## 2023-02-27 DIAGNOSIS — J45.40 MODERATE PERSISTENT ASTHMA, UNCOMPLICATED: ICD-10-CM

## 2023-03-02 RX ORDER — MONTELUKAST SODIUM 10 MG/1
TABLET ORAL
Qty: 90 TABLET | Refills: 0 | Status: SHIPPED | OUTPATIENT
Start: 2023-03-02

## 2023-03-09 DIAGNOSIS — E11.65 TYPE 2 DIABETES MELLITUS WITH HYPERGLYCEMIA, WITHOUT LONG-TERM CURRENT USE OF INSULIN (HCC): ICD-10-CM

## 2023-03-10 RX ORDER — CALCIUM CITRATE/VITAMIN D3 200MG-6.25
TABLET ORAL
Qty: 100 STRIP | Refills: 3 | Status: SHIPPED | OUTPATIENT
Start: 2023-03-10

## 2023-03-10 RX ORDER — LANCETS
EACH MISCELLANEOUS
Qty: 30 EACH | Refills: 11 | Status: SHIPPED | OUTPATIENT
Start: 2023-03-10

## 2023-03-13 ENCOUNTER — PATIENT MESSAGE (OUTPATIENT)
Dept: PRIMARY CARE CLINIC | Age: 73
End: 2023-03-13

## 2023-03-16 RX ORDER — PIOGLITAZONEHYDROCHLORIDE 15 MG/1
TABLET ORAL
Qty: 30 TABLET | Refills: 0 | Status: SHIPPED | OUTPATIENT
Start: 2023-03-16

## 2023-04-20 DIAGNOSIS — K21.9 GASTROESOPHAGEAL REFLUX DISEASE WITHOUT ESOPHAGITIS: Chronic | ICD-10-CM

## 2023-04-21 RX ORDER — LANSOPRAZOLE 30 MG/1
30 CAPSULE, DELAYED RELEASE ORAL DAILY
Qty: 90 CAPSULE | Refills: 1 | Status: SHIPPED | OUTPATIENT
Start: 2023-04-21

## 2023-04-21 NOTE — TELEPHONE ENCOUNTER
Requested Prescriptions     Pending Prescriptions Disp Refills    lansoprazole (PREVACID) 30 mg capsule 90 Capsule 1     Sig: Take 1 Capsule by mouth daily. Allergies   Allergen Reactions    Iodine Rash       Last visit with ordering provider: 2/2/23  Next visit with ordering NFC[facility]:6/5/98  Is order duplicate: no  Last filled: 10/11/22    Current Outpatient Medications   Medication Instructions    atorvastatin (LIPITOR) 10 mg tablet Take 1 tablet by mouth once daily    atorvastatin (LIPITOR) 20 mg, Oral, EVERY BEDTIME    Blood-Glucose Meter monitoring kit Use to test blood sugar    dulaglutide (TRULICITY) 1.5 mg, SubCUTAneous, EVERY 7 DAYS    fexofenadine (ALLEGRA) 180 mg, Oral, DAILY    fluticasone propion-salmeteroL (ADVAIR/WIXELA) 100-50 mcg/dose diskus inhaler 1 Puff, Inhalation, 2 TIMES DAILY    fluticasone propionate (FLOVENT HFA) 110 mcg/actuation inhaler 1 Puff, Inhalation, 2 TIMES DAILY    glyBURIDE (DIABETA) 2.5 mg tablet Take 1 tablet by mouth twice daily    lancets misc Use to check blood sugar    lansoprazole (PREVACID) 30 mg capsule Take 1 capsule by mouth once daily    lisinopriL (PRINIVIL, ZESTRIL) 20 mg, Oral, DAILY    lisinopril-hydroCHLOROthiazide (PRINZIDE, ZESTORETIC) 20-25 mg per tablet Take 1 tablet by mouth once daily    metFORMIN (GLUCOPHAGE) 1,000 mg tablet Take 1 tablet by mouth twice daily    montelukast (SINGULAIR) 10 mg tablet Take 1 tablet by mouth once daily    Mucinex 1,200 mg, 2 TIMES DAILY    pioglitazone (ACTOS) 15 mg tablet Take 1 tablet by mouth once daily    polyethylene glycol (MIRALAX) 17 g, Oral, 2 TIMES DAILY AS NEEDED    True Metrix Glucose Test Strip strip USE 1 STRIP TO CHECK GLUCOSE DAILY.        Signed By: Catherine Delgado     April 21, 2023

## 2023-05-31 ENCOUNTER — TELEPHONE (OUTPATIENT)
Dept: PRIMARY CARE CLINIC | Facility: CLINIC | Age: 73
End: 2023-05-31

## 2023-06-02 RX ORDER — LISINOPRIL AND HYDROCHLOROTHIAZIDE 25; 20 MG/1; MG/1
1 TABLET ORAL DAILY
Qty: 90 TABLET | Refills: 1 | Status: SHIPPED | OUTPATIENT
Start: 2023-06-02

## 2023-06-08 RX ORDER — ATORVASTATIN CALCIUM 20 MG/1
20 TABLET, FILM COATED ORAL NIGHTLY
Qty: 90 TABLET | Refills: 0 | Status: SHIPPED | OUTPATIENT
Start: 2023-06-08

## 2023-08-14 RX ORDER — DULAGLUTIDE 1.5 MG/.5ML
INJECTION, SOLUTION SUBCUTANEOUS
Qty: 4 ML | Refills: 0 | OUTPATIENT
Start: 2023-08-14

## 2025-03-04 ENCOUNTER — TRANSCRIBE ORDERS (OUTPATIENT)
Facility: HOSPITAL | Age: 75
End: 2025-03-04

## 2025-03-04 DIAGNOSIS — N18.32 STAGE 3B CHRONIC KIDNEY DISEASE (HCC): Primary | ICD-10-CM

## 2025-03-04 DIAGNOSIS — I10 ESSENTIAL HYPERTENSION: ICD-10-CM
